# Patient Record
Sex: MALE | Race: BLACK OR AFRICAN AMERICAN | NOT HISPANIC OR LATINO | URBAN - METROPOLITAN AREA
[De-identification: names, ages, dates, MRNs, and addresses within clinical notes are randomized per-mention and may not be internally consistent; named-entity substitution may affect disease eponyms.]

---

## 2018-05-19 ENCOUNTER — EMERGENCY (EMERGENCY)
Facility: HOSPITAL | Age: 28
LOS: 1 days | Discharge: PSYCHIATRIC OR SUBSTANCE USE FACILITY | End: 2018-05-19
Attending: EMERGENCY MEDICINE
Payer: COMMERCIAL

## 2018-05-19 VITALS
HEART RATE: 98 BPM | OXYGEN SATURATION: 96 % | SYSTOLIC BLOOD PRESSURE: 147 MMHG | DIASTOLIC BLOOD PRESSURE: 100 MMHG | RESPIRATION RATE: 18 BRPM

## 2018-05-19 LAB
ALBUMIN SERPL ELPH-MCNC: 4 G/DL — SIGNIFICANT CHANGE UP (ref 3.3–5)
ALP SERPL-CCNC: 85 U/L — SIGNIFICANT CHANGE UP (ref 40–120)
ALT FLD-CCNC: 18 U/L — SIGNIFICANT CHANGE UP (ref 10–45)
AMPHET UR-MCNC: NEGATIVE — SIGNIFICANT CHANGE UP
ANION GAP SERPL CALC-SCNC: 13 MMOL/L — SIGNIFICANT CHANGE UP (ref 5–17)
APAP SERPL-MCNC: <15 UG/ML — SIGNIFICANT CHANGE UP (ref 10–30)
APPEARANCE UR: CLEAR — SIGNIFICANT CHANGE UP
AST SERPL-CCNC: 26 U/L — SIGNIFICANT CHANGE UP (ref 10–40)
BARBITURATES UR SCN-MCNC: NEGATIVE — SIGNIFICANT CHANGE UP
BASOPHILS # BLD AUTO: 0 K/UL — SIGNIFICANT CHANGE UP (ref 0–0.2)
BASOPHILS NFR BLD AUTO: 0.3 % — SIGNIFICANT CHANGE UP (ref 0–2)
BENZODIAZ UR-MCNC: NEGATIVE — SIGNIFICANT CHANGE UP
BILIRUB SERPL-MCNC: 0.4 MG/DL — SIGNIFICANT CHANGE UP (ref 0.2–1.2)
BILIRUB UR-MCNC: NEGATIVE — SIGNIFICANT CHANGE UP
BUN SERPL-MCNC: 8 MG/DL — SIGNIFICANT CHANGE UP (ref 7–23)
CALCIUM SERPL-MCNC: 8.9 MG/DL — SIGNIFICANT CHANGE UP (ref 8.4–10.5)
CHLORIDE SERPL-SCNC: 102 MMOL/L — SIGNIFICANT CHANGE UP (ref 96–108)
CO2 SERPL-SCNC: 21 MMOL/L — LOW (ref 22–31)
COCAINE METAB.OTHER UR-MCNC: NEGATIVE — SIGNIFICANT CHANGE UP
COLOR SPEC: SIGNIFICANT CHANGE UP
CREAT SERPL-MCNC: 0.84 MG/DL — SIGNIFICANT CHANGE UP (ref 0.5–1.3)
DIFF PNL FLD: NEGATIVE — SIGNIFICANT CHANGE UP
EOSINOPHIL # BLD AUTO: 0 K/UL — SIGNIFICANT CHANGE UP (ref 0–0.5)
EOSINOPHIL NFR BLD AUTO: 0.6 % — SIGNIFICANT CHANGE UP (ref 0–6)
ETHANOL SERPL-MCNC: SIGNIFICANT CHANGE UP MG/DL (ref 0–10)
GLUCOSE SERPL-MCNC: 124 MG/DL — HIGH (ref 70–99)
GLUCOSE UR QL: NEGATIVE — SIGNIFICANT CHANGE UP
HCT VFR BLD CALC: 42.9 % — SIGNIFICANT CHANGE UP (ref 39–50)
HGB BLD-MCNC: 14.3 G/DL — SIGNIFICANT CHANGE UP (ref 13–17)
KETONES UR-MCNC: NEGATIVE — SIGNIFICANT CHANGE UP
LEUKOCYTE ESTERASE UR-ACNC: NEGATIVE — SIGNIFICANT CHANGE UP
LYMPHOCYTES # BLD AUTO: 1.2 K/UL — SIGNIFICANT CHANGE UP (ref 1–3.3)
LYMPHOCYTES # BLD AUTO: 20.1 % — SIGNIFICANT CHANGE UP (ref 13–44)
MCHC RBC-ENTMCNC: 30.7 PG — SIGNIFICANT CHANGE UP (ref 27–34)
MCHC RBC-ENTMCNC: 33.4 GM/DL — SIGNIFICANT CHANGE UP (ref 32–36)
MCV RBC AUTO: 92 FL — SIGNIFICANT CHANGE UP (ref 80–100)
METHADONE UR-MCNC: NEGATIVE — SIGNIFICANT CHANGE UP
MONOCYTES # BLD AUTO: 0.6 K/UL — SIGNIFICANT CHANGE UP (ref 0–0.9)
MONOCYTES NFR BLD AUTO: 10.3 % — SIGNIFICANT CHANGE UP (ref 2–14)
NEUTROPHILS # BLD AUTO: 4.2 K/UL — SIGNIFICANT CHANGE UP (ref 1.8–7.4)
NEUTROPHILS NFR BLD AUTO: 68.7 % — SIGNIFICANT CHANGE UP (ref 43–77)
NITRITE UR-MCNC: NEGATIVE — SIGNIFICANT CHANGE UP
OPIATES UR-MCNC: NEGATIVE — SIGNIFICANT CHANGE UP
OXYCODONE UR-MCNC: NEGATIVE — SIGNIFICANT CHANGE UP
PCP SPEC-MCNC: SIGNIFICANT CHANGE UP
PCP UR-MCNC: NEGATIVE — SIGNIFICANT CHANGE UP
PH UR: 6.5 — SIGNIFICANT CHANGE UP (ref 5–8)
PLATELET # BLD AUTO: 151 K/UL — SIGNIFICANT CHANGE UP (ref 150–400)
POTASSIUM SERPL-MCNC: 3.7 MMOL/L — SIGNIFICANT CHANGE UP (ref 3.5–5.3)
POTASSIUM SERPL-SCNC: 3.7 MMOL/L — SIGNIFICANT CHANGE UP (ref 3.5–5.3)
PROT SERPL-MCNC: 7.2 G/DL — SIGNIFICANT CHANGE UP (ref 6–8.3)
PROT UR-MCNC: NEGATIVE — SIGNIFICANT CHANGE UP
RBC # BLD: 4.66 M/UL — SIGNIFICANT CHANGE UP (ref 4.2–5.8)
RBC # FLD: 11.2 % — SIGNIFICANT CHANGE UP (ref 10.3–14.5)
SALICYLATES SERPL-MCNC: <2 MG/DL — LOW (ref 15–30)
SODIUM SERPL-SCNC: 136 MMOL/L — SIGNIFICANT CHANGE UP (ref 135–145)
SP GR SPEC: 1.01 — LOW (ref 1.01–1.02)
THC UR QL: NEGATIVE — SIGNIFICANT CHANGE UP
TROPONIN T SERPL-MCNC: <0.01 NG/ML — SIGNIFICANT CHANGE UP (ref 0–0.06)
TSH SERPL-MCNC: 2.48 UIU/ML — SIGNIFICANT CHANGE UP (ref 0.27–4.2)
UROBILINOGEN FLD QL: NEGATIVE — SIGNIFICANT CHANGE UP
WBC # BLD: 6.2 K/UL — SIGNIFICANT CHANGE UP (ref 3.8–10.5)
WBC # FLD AUTO: 6.2 K/UL — SIGNIFICANT CHANGE UP (ref 3.8–10.5)

## 2018-05-19 PROCEDURE — 71045 X-RAY EXAM CHEST 1 VIEW: CPT | Mod: 26

## 2018-05-19 PROCEDURE — 90792 PSYCH DIAG EVAL W/MED SRVCS: CPT | Mod: GT

## 2018-05-19 PROCEDURE — 70450 CT HEAD/BRAIN W/O DYE: CPT | Mod: 26

## 2018-05-19 PROCEDURE — 93010 ELECTROCARDIOGRAM REPORT: CPT

## 2018-05-19 PROCEDURE — 99285 EMERGENCY DEPT VISIT HI MDM: CPT | Mod: 25

## 2018-05-19 RX ORDER — DIPHENHYDRAMINE HCL 50 MG
50 CAPSULE ORAL ONCE
Qty: 0 | Refills: 0 | Status: COMPLETED | OUTPATIENT
Start: 2018-05-19 | End: 2018-05-19

## 2018-05-19 RX ORDER — HALOPERIDOL DECANOATE 100 MG/ML
5 INJECTION INTRAMUSCULAR ONCE
Qty: 0 | Refills: 0 | Status: COMPLETED | OUTPATIENT
Start: 2018-05-19 | End: 2018-05-19

## 2018-05-19 RX ADMIN — Medication 2 MILLIGRAM(S): at 16:15

## 2018-05-19 RX ADMIN — Medication 2 MILLIGRAM(S): at 15:55

## 2018-05-19 RX ADMIN — HALOPERIDOL DECANOATE 5 MILLIGRAM(S): 100 INJECTION INTRAMUSCULAR at 16:14

## 2018-05-19 RX ADMIN — Medication 50 MILLIGRAM(S): at 16:14

## 2018-05-19 RX ADMIN — HALOPERIDOL DECANOATE 5 MILLIGRAM(S): 100 INJECTION INTRAMUSCULAR at 15:55

## 2018-05-19 NOTE — ED PROVIDER NOTE - PROGRESS NOTE DETAILS
Patient severely agitated despite escalation measures, given sedation medications, required restraints due to danger to himself and others.  - Jayy Espino MD Patient calm and cooperative. Daytime Psych Attg signed-out to TelePsych. Awaiting TelePsych eval for dispo. Hand-off to Dr Ramon. Ankit Parmar MD (resident): Dr. Nichols (psych) is aware of pending reevaluation and will come to reassess pt. Ankit Parmar MD (resident): patient eval'd by Dr. Nichols and requires inpatient psych admission due to lack of safety to himself and others. acute psychosis, either 2/2 adderall or bipolar (new onset). 2PC filled out. pt given Ativan as per psych recs. Pending placement, GARY Bowden on board.

## 2018-05-19 NOTE — ED PROVIDER NOTE - PHYSICAL EXAMINATION
PE: CONSTITUTIONAL: Nontoxic, in severe agitation and apparent distress, now sedated s/p medications. ENMT: Airway patent, nasal mucosa clear, mouth with normal mucosa. HEAD: NCAT EYES: PERRL, EOMI bilaterally CARDIAC: RRR, no pedal edema RESPIRATORY: CTA bilaterally grossly, no adventitious sounds GI: Abdomen non-distended, non-tender MSK: Spine appears normal, range of motion is not limited, no muscle/joint tenderness NEURO: sedated, previously moving all extremities, no focal neurological deficits, gait intact prior to sedation SKIN: Skin tone normal in color, warm and dry. No evidence of rash. PSYCH: now sedated, previously agitated with disorganized behavior, irrational thinking

## 2018-05-19 NOTE — ED ADULT NURSE REASSESSMENT NOTE - NS ED NURSE REASSESS COMMENT FT1
Restraints discontinued at 1715, applied from [id number], VSS, no signs of injury, full ROM, pt sleeping at this time; BW and EKG obtained, remains on constant observation for safety, continue to monitor.

## 2018-05-19 NOTE — ED ADULT NURSE REASSESSMENT NOTE - NS ED NURSE REASSESS COMMENT FT1
pt. was escorted to the bathroom and he was able to give a urine specimen. pt. was calm and cooperative. he was given his dinner tray by 1:1 staff. will continue to offer support and monitor for safety.

## 2018-05-19 NOTE — ED PROVIDER NOTE - ATTENDING CONTRIBUTION TO CARE
Dr. Moreland : I have personally seen and examined this patient at the bedside. I have fully participated in the care of this patient. I have reviewed all pertinent clinical information, including history, physical exam, plan and the Resident's note and agree except as noted.     28yo M no pmh of psych hx p/w psychosis not sleeping x 24hrs, as per brother notes that was started on adderral in the beginning of this month (has 19 pills left in the bottle)- not sure if that what is making him this way. no hx of si/hi as per family.   Denies f/c/n/v/cp/sob/palpitations/cough/abd.pain/d/c/dysuria/hematuria. no sick contacts/recent travel. no head trauma    PE:  head; atraumatic normocephalic  eyes: perrla  Heart: rrr s1s2  lungs: ctab  abd: soft, nt nd + bs no rebound/guarding no cva ttp  le: no swelling no calf ttp  back: no midline cervical/thoracic/lumbar ttp      -->possibly psychotic episode--emily fu ct head labs tox; ekg fluids; 10/4 for pts protection and staff as pt is aggressive psych consult--reassess

## 2018-05-19 NOTE — ED PROVIDER NOTE - SHIFT CHANGE DETAILS
Viktoria Wilson MD - Attending Physician: Pt here with acute psychosis. Very agitated, required chemical sedation. Awaiting labs for medical clearance, then psych consult

## 2018-05-19 NOTE — ED PROVIDER NOTE - OBJECTIVE STATEMENT
27y Male PMH possible ADHD BIB family for severe agitation. Information from family as patient was agitated and uncooperative to provide a history. Patient was prescribed Adderall as an outpatient by a doctor referred by his PCP, patient appeared to have been taking the correct dose of medications as patient still has 19 pills in his bottle. Over the course of the past two weeks since taking the medication, family notes patient has been more aggressive and agitated, worsening especially in the last 24 hours as he has not gotten any sleep. No psychiatric history, no known other drug ingestions, patient reticent to come to the hospital, but family convinced him to come.

## 2018-05-19 NOTE — ED ADULT NURSE NOTE - OBJECTIVE STATEMENT
27 year old male BIB brother and PD for decompensation "Not acting right". Upon arrival pt was paranoid appeared thought blocked and fearful, kept scanning room and brother had to physically hold him back from leaving. Pt them became agitated and started screaming, all attempts at deescalation unsuccusseful, manual 27 year old male BIB brother and PD for decompensation "Not acting right". Upon arrival pt was paranoid appeared thought blocked and fearful, kept scanning room and brother had to physically hold him back from leaving. Pt them became agitated and started screaming, all attempts at deescalation unsuccessful, manual hold applied by staff and police and pt given haldol 5mg and ativan 2mg IM with no effect, police had to apply handcuffs and pt was brought to stretcher and placed in 4 point restraints; given haldol 5mg ativan 2mg and benadryl 50mg with better effect, remained resistant to all tx but calmer. Brother had bottle of prescription amphetamines that pt believed ot be using. Conitue to monitor. 27 year old male BIB brother and sister with [place] [employer] and EMS "Not acting right". Upon arrival pt was paranoid, appeared thought blocked and fearful, kept scanning room and brother had to physically hold him back from leaving. Pt them became agitated and started screaming, all attempts at deescalation unsuccessful, manual hold applied by staff and police and pt given haldol 5mg and ativan 2mg IM with no effect, police had to apply handcuffs and pt was brought to stretcher and placed in 4 point restraints; given haldol 5mg ativan 2mg and benadryl 50mg with better effect, remained resistant to all tx but calmer. Brother had bottle of prescription amphetamines that pt is believed to be taking "his wife says he take Adderall", brother states that pt began acting erratically yesterday, appeared psychotic and did not sleep all night; as per brother pt has no psych HX and no drug HX. Continue to monitor.

## 2018-05-20 ENCOUNTER — INPATIENT (INPATIENT)
Facility: HOSPITAL | Age: 28
LOS: 10 days | Discharge: HOME | End: 2018-05-31
Attending: PSYCHIATRY & NEUROLOGY | Admitting: PSYCHIATRY & NEUROLOGY
Payer: COMMERCIAL

## 2018-05-20 VITALS
RESPIRATION RATE: 20 BRPM | TEMPERATURE: 98 F | HEART RATE: 85 BPM | OXYGEN SATURATION: 100 % | DIASTOLIC BLOOD PRESSURE: 95 MMHG | SYSTOLIC BLOOD PRESSURE: 140 MMHG

## 2018-05-20 VITALS — TEMPERATURE: 97 F

## 2018-05-20 DIAGNOSIS — F19.950 OTHER PSYCHOACTIVE SUBSTANCE USE, UNSPECIFIED WITH PSYCHOACTIVE SUBSTANCE-INDUCED PSYCHOTIC DISORDER WITH DELUSIONS: ICD-10-CM

## 2018-05-20 DIAGNOSIS — F06.2 PSYCHOTIC DISORDER WITH DELUSIONS DUE TO KNOWN PHYSIOLOGICAL CONDITION: ICD-10-CM

## 2018-05-20 PROCEDURE — 96372 THER/PROPH/DIAG INJ SC/IM: CPT

## 2018-05-20 PROCEDURE — 99285 EMERGENCY DEPT VISIT HI MDM: CPT | Mod: 25

## 2018-05-20 PROCEDURE — 84484 ASSAY OF TROPONIN QUANT: CPT

## 2018-05-20 PROCEDURE — 81003 URINALYSIS AUTO W/O SCOPE: CPT

## 2018-05-20 PROCEDURE — 93005 ELECTROCARDIOGRAM TRACING: CPT

## 2018-05-20 PROCEDURE — 70450 CT HEAD/BRAIN W/O DYE: CPT

## 2018-05-20 PROCEDURE — 90853 GROUP PSYCHOTHERAPY: CPT

## 2018-05-20 PROCEDURE — 80053 COMPREHEN METABOLIC PANEL: CPT

## 2018-05-20 PROCEDURE — 85027 COMPLETE CBC AUTOMATED: CPT

## 2018-05-20 PROCEDURE — 80307 DRUG TEST PRSMV CHEM ANLYZR: CPT

## 2018-05-20 PROCEDURE — 71045 X-RAY EXAM CHEST 1 VIEW: CPT

## 2018-05-20 PROCEDURE — 84443 ASSAY THYROID STIM HORMONE: CPT

## 2018-05-20 RX ORDER — HALOPERIDOL DECANOATE 100 MG/ML
5 INJECTION INTRAMUSCULAR EVERY 4 HOURS
Qty: 0 | Refills: 0 | Status: DISCONTINUED | OUTPATIENT
Start: 2018-05-20 | End: 2018-05-31

## 2018-05-20 RX ORDER — ACETAMINOPHEN 500 MG
975 TABLET ORAL ONCE
Qty: 0 | Refills: 0 | Status: COMPLETED | OUTPATIENT
Start: 2018-05-20 | End: 2018-05-20

## 2018-05-20 RX ORDER — RISPERIDONE 4 MG/1
1 TABLET ORAL AT BEDTIME
Qty: 0 | Refills: 0 | Status: DISCONTINUED | OUTPATIENT
Start: 2018-05-20 | End: 2018-05-21

## 2018-05-20 RX ORDER — BENZTROPINE MESYLATE 1 MG
1 TABLET ORAL ONCE
Qty: 0 | Refills: 0 | Status: COMPLETED | OUTPATIENT
Start: 2018-05-20 | End: 2018-05-20

## 2018-05-20 RX ORDER — ACETAMINOPHEN 500 MG
650 TABLET ORAL EVERY 6 HOURS
Qty: 0 | Refills: 0 | Status: DISCONTINUED | OUTPATIENT
Start: 2018-05-20 | End: 2018-05-31

## 2018-05-20 RX ORDER — DIPHENHYDRAMINE HCL 50 MG
50 CAPSULE ORAL ONCE
Qty: 0 | Refills: 0 | Status: DISCONTINUED | OUTPATIENT
Start: 2018-05-20 | End: 2018-05-31

## 2018-05-20 RX ORDER — BENZTROPINE MESYLATE 1 MG
1 TABLET ORAL DAILY
Qty: 0 | Refills: 0 | Status: DISCONTINUED | OUTPATIENT
Start: 2018-05-21 | End: 2018-05-30

## 2018-05-20 RX ORDER — HALOPERIDOL DECANOATE 100 MG/ML
5 INJECTION INTRAMUSCULAR ONCE
Qty: 0 | Refills: 0 | Status: DISCONTINUED | OUTPATIENT
Start: 2018-05-20 | End: 2018-05-31

## 2018-05-20 RX ORDER — DIPHENHYDRAMINE HCL 50 MG
50 CAPSULE ORAL EVERY 6 HOURS
Qty: 0 | Refills: 0 | Status: DISCONTINUED | OUTPATIENT
Start: 2018-05-20 | End: 2018-05-31

## 2018-05-20 RX ADMIN — Medication 2 MILLIGRAM(S): at 16:15

## 2018-05-20 RX ADMIN — Medication 1 MILLIGRAM(S): at 09:40

## 2018-05-20 RX ADMIN — Medication 1 MILLIGRAM(S): at 16:15

## 2018-05-20 RX ADMIN — RISPERIDONE 1 MILLIGRAM(S): 4 TABLET ORAL at 22:11

## 2018-05-20 NOTE — ED BEHAVIORAL HEALTH ASSESSMENT NOTE - OTHER
15 Tika Benjamin brother family some residual paranoia as per HPI somewhat somnolent but easily arousable to verbal stimuli not assessed n/a

## 2018-05-20 NOTE — CHART NOTE - NSCHARTNOTEFT_GEN_A_CORE
Patient referred to Social Work by Psychiatry MD due to patient requiring an inpatient psych admission. Chart reviewed. Patient is a 28 y/o, Male with no formal PPHx, newly diagnosed with ADHD as outpatient and started on Adderall p/w acute onset paranoia, hyper-religiosity, and manic symptoms.  Patient has been agitated and combative in ER, required multiple Haldol/Ativan PRNs and on restraints. Per psychiatry, patient diagnosed with Substance induced psychotic disorder with delusions. Per psychiatry, patient requires involuntary admission to inpatient psych unit for safety and stabilization.     SW paged and spoke with [facility] GOKUL Yip to confirm bed availability. SW faxed requested documentation to [phone]. Documentation reviewed and admission accepted by AUSTIN MD Robbins. Per GOKUL Yip, patient to have bed at [address] [place] [address] (ph. [phone]). [facility] Telepsych email sent with the above details. Psych MD Nichols informed of all transfer details and will fax Medical Readiness Form to [facility]. ED MD Parmar aware and reports patient is medically cleared for discharge.  MIGDALIA Euceda informed to contact [address] [place] [address] with RN Handoff and to contact EMS for non-emergent transport from [facility] ED to [address].  Packet prepared. Original legals placed in transfer packet to travel with patient.    Patient with [employer] (policy #[id number]) insurance benefits. [facility] contacted [employer] (ph. [phone]) to obtain insurance authorization for psych transfer. Clinicals provided. Patient authorized for 6 days. Next review is on 5/25,  to be assigned.  Authorization #[id number]. Patient authorized for ambulance services through [facility]. Same authorization.   SW contacted patient's brother, Vasyl (ph. [phone]) to inform of all the details of above. Patient's brother aware of unit and phone number to unit. Social Work will continue to remain available and collaborate with interdisciplinary team.

## 2018-05-20 NOTE — ED BEHAVIORAL HEALTH ASSESSMENT NOTE - RISK ASSESSMENT
Pt is currently at moderately elevated risk of harm to self & others given his lingering paranoia & very recent episode of acute agitation & psychosis, and warrants continued observation in the ED until the morning & re-assessment for a final determination of risk prior to his being discharged home.

## 2018-05-20 NOTE — ED ADULT NURSE REASSESSMENT NOTE - NS ED NURSE REASSESS COMMENT FT1
reiterated to pt. that psychiatry will re-evaluate him for final disposition. remains on 1:1 CO for psychosis.

## 2018-05-20 NOTE — ED ADULT NURSE REASSESSMENT NOTE - NS ED NURSE REASSESS COMMENT FT1
pt. was evaluated via Telepsych in a private room w/ 1:1 male staff in the room and was able to participate w/ the interview. However, the disposition is pending @ this time. will continue to monitor.

## 2018-05-20 NOTE — ED BEHAVIORAL HEALTH ASSESSMENT NOTE - SUMMARY
Pt's history & presentation are most consistent with a substance-induced psychotic episode, induced in this case by Adderall.  The relatively low dose of Adderall that the patient has been taking, however, is not likely to cause adverse effects this pronounced in the absence of some underlying predisposition toward manic or psychotic illness; there is thus a relatively high likelihood that this is what has been unmasked in this case, specifically a previously unrecognized & unmanifested diathesis toward bipolar illness.  Both the pt & his family have been made aware of this likelihood, which is the principal rationale behind my offering him voluntary psychiatric admission, which he has declined.  In his current state, he does not quite appear to be at an acute or imminent risk of dangerousness of sufficient severity to meet involuntary commitment criteria. I have recommended to the pt that he remain in the ED to obtain more sleep & permit further psychiatric stabilization before he is permitted to leave, to which he is amenable.    Plan therefore will be to hold pt in the ED for re-assessment in the morning, at which time he will be discharged home if deemed stable enough to do so.  His family should be contacted at that point so they can come back to the ED & escort the pt home.

## 2018-05-20 NOTE — ED BEHAVIORAL HEALTH ASSESSMENT NOTE - OTHER PAST PSYCHIATRIC HISTORY (INCLUDE DETAILS REGARDING ONSET, COURSE OF ILLNESS, INPATIENT/OUTPATIENT TREATMENT)
As per HPI.  Prior to this month, patient has never received mental health treatment of any kind.  No lifetime history of symptoms consistent with depression, valeri or psychosis prior to the past 1-2 months.

## 2018-05-20 NOTE — ED BEHAVIORAL HEALTH ASSESSMENT NOTE - PSYCHIATRIC ISSUES AND PLAN (INCLUDE STANDING AND PRN MEDICATION)
For acute agitation or dangerousness, can use Haldol 5mg PO / IM, Ativan 2mg PO / IM and/or Benadryl 50mg PO / IM, all Q6h PRN.

## 2018-05-20 NOTE — ED BEHAVIORAL HEALTH NOTE - BEHAVIORAL HEALTH NOTE
Pt seen for follow-up of valeri/psychosis.  Pt received PRN Haldol 10mg and Ativan IM 4mg total at 4pm yesterday.      Pt today stating "I think I might know when the apocalypse is coming," additionally endorsed auditory and visual hallucinations, stated that he is the "Antichrist" asked interviewer "how is the world right now?" and "may I please speak with the  I need him to pray for me."  Pt is A&O x3.  Pt reports he has been taking Adderall which was prescribed to him by a NP in [place].  I STOP Ref [id number] indicates pt has one prescription of Adderall 10mg, 30 day supply filled 05/02/2018.  Pt stated that he has an associates degree in [employer] 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  Pt reports now that "my wife is primary breadwinner."        Collateral obtained from wife and brother Newton ([phone]).  Wife reports that pt has experienced a lot of stress as he has been unemployed for last 3 months.  Pt and wife have 2 children, currently live with parents.  Over the past 2 days pt has made increasingly bizarre statements including "my life is over" and "the police are following me."    Brother Newton reports that pt was at baseline level of functioning 4 months ago however his behavior changed ~3 months ago shortly after he lost his job.  Over this time brother has noted that pt has been increasingly irritable and argumentative, has also been very depressed and made suicidal statements.    MSE:  General appearance: appears stated age  Behavior/attitude: Cooperative initially then increasingly agitated and uncooperative, unable to complete interview after pt demanded to speak with   Psychomotor: Abnormal posturing while in bed  Speech: Normal rate, tone, prosody  Mood: Unable to elicit  Affect: Anxious appearing  Thought process: Disorganized, tangential  Thought content: Grandiose delusion that he is the antichrist and that the world's future is in his hands  Perception: Endorsed auditory and visual hallucinations: "hearing voices and seeing things", unable to specify further after repeated questioning  Impulse control: Impaired  Insight/judgment: Poor/poor    A/P Pt is a 28 YO male domiciled with parents, wife, and 2 children in [place], unemployed for past 3 months, recently prescribed Adderall 10mg x30 on 5/2/18 (I STOP Ref [id number]) for presumed ADHD, no prior psychiatric hospitalizations, no other substance abuse, BIB family after 2wk period of increasingly bizarre behavior in context of months of irritability, agitation, and depressed mood.  Currently pt is exhibiting disorganized thought process, grandiose delusion that he is the antichrist, and endorsing auditory/visual hallucinations of "hearing voices and seeing things."  Current presentation indicative of brief psychotic episode.  Differential includes brief psychotic episode, substance induced vs. brief psychotic episode vs. bipolar disorder vs. MDD with psychosis.  In pt's current psychotic state pt is at a danger to himself and others and warrants inpatient psychiatric hospitalization for symptom stabilization.  Brother and wife have been notified, in agreement with plan.      Plan-  Psychiatric- Haldol 5mg, Ativan 2mg, Benadryl 50mg PO Q4H/IM  PRN agitation.    Medical-- CBC, CMP,, CT head, CXR WNL  Substance- UDS unremarkable (negative amphetamines)  Safety- CO for agitation  Disposition: Admit to inpatient psychiatric hospital, 9.27 involuntary status Pt seen for follow-up of valeri/psychosis.  Pt received PRN Haldol 10mg and Ativan IM 4mg total at 4pm yesterday.      Pt today stating "I think I might know when the apocalypse is coming," additionally endorsed auditory and visual hallucinations, stated that he is the "Antichrist" asked interviewer "how is the world right now?" and "may I please speak with the  I need him to pray for me."  Pt is A&O x3.  Pt reports he has been taking Adderall which was prescribed to him by a NP in [place].  I STOP Ref [id number] indicates pt has one prescription of Adderall 10mg, 30 day supply filled 05/02/2018.  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  Pt reports now that "my wife is primary breadwinner."        Collateral obtained from wife and brother Newton ([phone]).  Wife reports that pt has experienced a lot of stress as he has been unemployed for last 3 months.  Pt and wife have 2 children, currently live with parents.  Over the past 2 days pt has made increasingly bizarre statements including "my life is over" and "the police are following me."    Brother Newton reports that pt was at baseline level of functioning 4 months ago however his behavior changed ~3 months ago shortly after he lost his job.  Over this time brother has noted that pt has been increasingly irritable and argumentative, has also been very depressed and made suicidal statements.    MSE:  General appearance: appears stated age  Behavior/attitude: Cooperative initially then increasingly agitated and uncooperative, unable to complete interview after pt demanded to speak with   Psychomotor: Abnormal posturing while in bed  Speech: Normal rate, tone, prosody  Mood: Unable to elicit  Affect: Anxious appearing  Thought process: Disorganized, tangential  Thought content: Grandiose delusion that he is the antichrist and that the world's future is in his hands  Perception: Endorsed auditory and visual hallucinations: "hearing voices and seeing things", unable to specify further after repeated questioning  Impulse control: Impaired  Insight/judgment: Poor/poor    A/P Pt is a 26 YO male domiciled with parents, wife, and 2 children in [place], unemployed for past 3 months, recently prescribed Adderall 10mg x30 on 5/2/18 (I STOP Ref [id number]) for presumed ADHD, no prior psychiatric hospitalizations, no other substance abuse, BIB family after 2wk period of increasingly bizarre behavior in context of months of irritability, agitation, and depressed mood.  Currently pt is exhibiting disorganized thought process, grandiose delusion that he is the antichrist, and endorsing auditory/visual hallucinations of "hearing voices and seeing things."  Current presentation indicative of brief psychotic episode.  Differential includes brief psychotic episode, substance induced vs. brief psychotic episode vs. bipolar disorder vs. MDD with psychosis.  In pt's current psychotic state pt is at a danger to himself and others and warrants inpatient psychiatric hospitalization for symptom stabilization.  Brother and wife have been notified, in agreement with plan.      Plan-  Psychiatric- Haldol 5mg, Ativan 2mg, Benadryl 50mg PO Q6H/IM  PRN agitation.    Medical-- CBC, CMP,, CT head, CXR WNL  Substance- UDS unremarkable (negative amphetamines)  Safety- CO for agitation while in ER, can be d/c when inpatient but low threshold to reinstate if continues to be agitated  Disposition: Admit to inpatient psychiatric hospital, 9.27 involuntary status      Attending Addendum (VIVIENNE Nichols MD):    27M with no formal PPHx, newly diagnosed with ADHD as outpatient and started on Adderall p/w acute onset paranoia, hyperreligiosity, and manic sx.  Has been agitated and combative in ER, required multiple Haldol/Ativan PRNs and restraints ON.  This morning, remains paranoid, impulsive, guarded.  Terminates psychiatric eval early and requesting to speak with a , states he has [employer] duties to perform.  Overnight was reportedly requesting security guards "shoot him" and kill him.  Pt's family concerned for safety.  Pt requires psychiatric admission for safety and stabilization.  Would initiate Risperdal 0.5mg PO bid.  Agree with PRNs above.  1:1 in ER and can be d/c as inpatient, but would have low threshold to reinstate 1:1 at [facility] if becomes agitated again.

## 2018-05-20 NOTE — ED ADULT NURSE REASSESSMENT NOTE - NS ED NURSE REASSESS COMMENT FT1
pt. to be evaluated via Telepsych for psychosis. maintained on 1:1 CO for psychosis. pt. is sleeping.

## 2018-05-20 NOTE — ED BEHAVIORAL HEALTH ASSESSMENT NOTE - INVOLUNTARY INTRAMUSCULAR MEDICATION DETAILS
Pt received a total of 10mg Haldol, 4mg Ativan & 50mg Benadryl IM as STAT PRN's for agitation at approximately 4:00pm

## 2018-05-20 NOTE — CHART NOTE - NSCHARTNOTEFT_GEN_A_CORE
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    FAMILY HISTORY:      MEDICATIONS  (STANDING):  risperiDONE   Tablet 1 milliGRAM(s) Oral at bedtime    MEDICATIONS  (PRN):  acetaminophen   Tablet 650 milliGRAM(s) Oral every 6 hours PRN pain  diphenhydrAMINE   Capsule 50 milliGRAM(s) Oral every 6 hours PRN EPS PPX  diphenhydrAMINE   Injectable 50 milliGRAM(s) IntraMuscular once PRN severe agitation secondary to psychosis  haloperidol     Tablet 5 milliGRAM(s) Oral every 4 hours PRN agitation secondary to psychosis  haloperidol    Injectable 5 milliGRAM(s) IntraMuscular once PRN severe agitation secondary to psychosis  LORazepam     Tablet 2 milliGRAM(s) Oral every 6 hours PRN Agitation  LORazepam   Injectable 2 milliGRAM(s) IntraMuscular once PRN severe agitation secondary to psychosis        CAPILLARY BLOOD GLUCOSE        I/O's Summary      PHYSICAL EXAM:  GENERAL: NAD, well-developed  HEAD:  Atraumatic, Normocephalic  EYES: EOMI, PERRLA, conjunctiva and sclera clear  NECK: Supple, No JVD  CHEST/LUNG: Clear to auscultation bilaterally; No wheeze  HEART: Regular rate and rhythm; No murmurs, rubs, or gallops  ABDOMEN: Soft, Nontender, Nondistended; Bowel sounds present  EXTREMITIES:  2+ Peripheral Pulses, No clubbing, cyanosis, or edema  PSYCH: AAOx3  NEUROLOGY: CN 2-12 intact  SKIN: No rashes or lesions    LABS:                        14.3   6.2   )-----------( 151      ( 19 May 2018 17:07 )             42.9     05-19    136  |  102  |  8   ----------------------------<  124<H>  3.7   |  21<L>  |  0.84    Ca    8.9      19 May 2018 17:07    TPro  7.2  /  Alb  4.0  /  TBili  0.4  /  DBili  x   /  AST  26  /  ALT  18  /  AlkPhos  85  -      CARDIAC MARKERS ( 19 May 2018 17:07 )  x     / <0.01 ng/mL / x     / x     / x          Urinalysis Basic - ( 19 May 2018 20:21 )    Color: PL Yellow / Appearance: Clear / S.008 / pH: x  Gluc: x / Ketone: Negative  / Bili: Negative / Urobili: Negative   Blood: x / Protein: Negative / Nitrite: Negative   Leuk Esterase: Negative / RBC: x / WBC x   Sq Epi: x / Non Sq Epi: x / Bacteria: x        RADIOLOGY & ADDITIONAL TESTS:    Assessment and Plan:  This is a 27 year old male with no pertinent PMH, however, reports recent diagnosis of ADHD and prescribed Adderall 10mg PO daily.  Patient states he only took a few pills since it was prescribed.  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  Patient denies any medical complains such as chest pain, SOB, lightheadedness/dizziness, n/v/d/c. His screening physical unremarkable and labs are grossly WNL.   1. Continue treatment as per primary psychiatry team: Risperidone 1mg PO standing: Diphenhydramine, Haloperidol, Lorazepam, and Acetaminophen PRN.

## 2018-05-20 NOTE — ED ADULT NURSE REASSESSMENT NOTE - NS ED NURSE REASSESS COMMENT FT1
pt. is awake and is focused on discharge. told him that he will be re-evaluated by psychiatrist in am for final disposition. remains on 1:1 CO for psychosis.

## 2018-05-20 NOTE — ED BEHAVIORAL HEALTH ASSESSMENT NOTE - DESCRIPTION
Pt highly agitated on initial presentation to the ED.  He received a total of 10mg Haldol, 4mg Ativan & 50mg Benadryl IM as STAT PRN's for agitation, ultimately with good effect. None As per HPI.  Pt is , no children, lives with his wife & sister, originally from [facility], where most of his extended family still live. Pt highly agitated on initial presentation to the ED.  He received a total of 10mg Haldol, 4mg Ativan & 50mg Benadryl IM as STAT PRN's for agitation, ultimately with good effect.  Pt has since slept through most of his ED stay thus far.    Third-party collateral obtained by [facility] April Masciana:  [facility] spoke with patient’s brother Newton to obtain collateral by phone. Brother states patient appears agitated and disorganized for 1-2 weeks with worsening of symptoms over past 2 days. Patient is reportedly fearful that the FBI is following him and focused on going to [facility] for safety. Patient has not slept for 36 hours and became agitated/yelling at family when encouraged to come to ED. Patient attempted to leave vehicle when in motion and required ED staff assistance to enter ED upon arrival. Brother states patient has no prior psychiatric history and was at baseline prior to starting new prescription of Adderall on 5/1/18 recommended by new psychiatrist. Family is unaware of any AH/VH and deny that patient has ever expressed any thoughts, intent or plans for suicide. Patient has no legal history or history of violence/aggression.  Patient does not use alcohol or drugs and has no family history of mental illness.  Family has expressed safety concerns for sudden change in overall presentation but deny that he is an acute risk for suicide. Patient lives with wife and sister and will not be left alone when able to return home.

## 2018-05-20 NOTE — ED BEHAVIORAL HEALTH ASSESSMENT NOTE - HPI (INCLUDE ILLNESS QUALITY, SEVERITY, DURATION, TIMING, CONTEXT, MODIFYING FACTORS, ASSOCIATED SIGNS AND SYMPTOMS)
Mr. Denny Earl is a , domiciled with wife & sister, recently unemployed, non-caregiver, 27-y.o. AAM with no relevant medical history, a psychiatric history of recent, provisional diagnosis of ADHD, no hospitalizations, no suicide attempts or non-suicidal self-injurious behavior, Mr. Denny Earl is a , domiciled with wife & sister, recently unemployed, non-caregiver, 27-y.o. AAM with no relevant medical history, a psychiatric history of recent, provisional diagnosis of ADHD, no hospitalizations, no suicide attempts or non-suicidal self-injurious behavior, no violence or forensic history, no substance abuse history, who is brought in by his family to the ED for a ~2 week history of increasingly bizarre behavior & paranoid ideation, along with inability to sleep for the previous 36 hours, occurring in the context of having been started on Adderall XR 10mg PO daily on 5/2/18.  On arrival to the ED at roughly 4:00pm today, the pt became extremely agitated & attempted to elope from the ED, ultimately receiving a total of 10mg Haldol, 4mg Ativan & 50mg Benadryl in successive IM injections as STAT PRNs, following which he slept for approximately 7-8 hours before waking up enough to adequately engage in interview. Mr. Denny Earl is a , domiciled with wife & sister, recently unemployed, non-caregiver, 27-y.o. AAM with no relevant medical history, a psychiatric history of recent, provisional diagnosis of ADHD, no hospitalizations, no suicide attempts or non-suicidal self-injurious behavior, no violence or forensic history, no substance abuse history, who is brought in by his family to the ED for a ~2 week history of increasingly bizarre behavior & paranoid ideation, along with inability to sleep for the previous 36 hours, occurring in the context of having been started on a trial of Adderall on 5/2/18.  On arrival to the ED at roughly 4:00pm today, the pt became extremely agitated & attempted to elope from the ED, ultimately receiving a total of 10mg Haldol, 4mg Ativan & 50mg Benadryl in successive IM injections as STAT PRNs, following which he slept for approximately 7-8 hours before waking up enough to adequately engage in interview.    On evaluation, pt is somewhat somnolent but arousable to verbal stimuli & generally able to provide coherent responses to questions.  He denies having had any mental health issues prior to losing his job ~2 months ago due to layoffs in the office of the [employer] member where he had been working for the preceding 2 years as a ; since the time this occurred, he states, "my mind has been spinning."  About a month ago the pt was referred from him PMD to a psychiatrist, Dr. Sreekanth Friedman, primarily for evaluation of concentration difficulties.  Upon evaluating the pt, Dr. Friedman made a provisional diagnosis of ADHD & started pt on a trial of Adderall XR 10mg daily, Mr. Denny Earl is a , domiciled with wife & sister, recently unemployed, non-caregiver, 27-y.o. AAM with no relevant medical history, a psychiatric history of recent, provisional diagnosis of ADHD, no hospitalizations, no suicide attempts or non-suicidal self-injurious behavior, no violence or forensic history, no substance abuse history, who is brought in by his family to the ED for a ~2 week history of increasingly bizarre behavior & paranoid ideation, along with inability to sleep for the previous 36 hours, occurring in the context of having been started on a trial of Adderall on 5/2/18.  On arrival to the ED at roughly 4:00pm today, the pt became extremely agitated & attempted to elope from the ED, ultimately receiving a total of 10mg Haldol, 4mg Ativan & 50mg Benadryl in successive IM injections as STAT PRNs, following which he slept for approximately 7-8 hours before waking up enough to adequately engage in interview.    On evaluation, pt is somewhat somnolent but arousable to verbal stimuli & generally able to provide coherent responses to questions.  He denies having had any mental health issues prior to losing his job ~2 months ago due to layoffs in the office of the [employer] member where he had been working for the preceding 2 years as a ; since the time this occurred, he states, "my mind has been spinning."  About a month ago the pt was referred from him PMD to a psychiatrist, Dr. Sreekanth Friedman, primarily for evaluation of concentration difficulties.  Upon evaluating the pt, Dr. Friedman made a provisional diagnosis of ADHD & started pt on a trial of Adderall XR 10mg daily,  Of note, p states he hasn't been taking the medication every day, more like every other day, and pt's family confirms that there are still 19 tabs left from the original supply of 30 tabs dispensed on 5/2/18, corroborating the pt's report.  Nevertheless, since this medication was started the pt's behavior has become more erratic, has been more angry & irritable & has started to feel paranoid, specifically that he is being targeted & monitored by the FBI, for unclear reasons.  He denies active suicidal ideation but does admit to passive thoughts of wishing he were dead, in order to "escape the pain that I'm in."  He denies thoughts of harming other people, denies auditory/visual hallucinations.  He denies any recent use of alcohol or illicit drugs.    At the time of interview, the pt continues to feel somewhat paranoid but denies passive or active suicidal ideation.  He continues to feel "less than clearheaded" but feels somewhat less confused & disoriented than he did at the time of initial presentation.  He is offered voluntary admission to inpatient psychiatry but declines, stating that he wishes to go home to be with his family, "eat a meal, and take a nap."  Short of immediate discharge, however, he is amenable to remaining in the ED overnight to obtain more sleep, with the plan for re-evaluation & likely discharge home in the AM.  Pt's family is amenable to this plan as well.

## 2018-05-21 LAB
CHOLEST SERPL-MCNC: 202 MG/DL — HIGH (ref 120–199)
HBA1C BLD-MCNC: 5.7 % — HIGH (ref 4–5.6)
HDLC SERPL-MCNC: 60 MG/DL — HIGH (ref 35–55)
HIV 1+2 AB+HIV1 P24 AG SERPL QL IA: SIGNIFICANT CHANGE UP
LIPID PNL WITH DIRECT LDL SERPL: 138 MG/DL — SIGNIFICANT CHANGE UP
T PALLIDUM AB TITR SER: NEGATIVE — SIGNIFICANT CHANGE UP
TRIGL SERPL-MCNC: 61 MG/DL — SIGNIFICANT CHANGE UP (ref 10–149)
VIT B12 SERPL-MCNC: 364 PG/ML — SIGNIFICANT CHANGE UP (ref 200–900)

## 2018-05-21 PROCEDURE — 90853 GROUP PSYCHOTHERAPY: CPT

## 2018-05-21 PROCEDURE — 99233 SBSQ HOSP IP/OBS HIGH 50: CPT | Mod: 25,GC

## 2018-05-21 RX ORDER — RISPERIDONE 4 MG/1
1 TABLET ORAL ONCE
Qty: 0 | Refills: 0 | Status: COMPLETED | OUTPATIENT
Start: 2018-05-21 | End: 2018-05-21

## 2018-05-21 RX ORDER — RISPERIDONE 4 MG/1
1 TABLET ORAL AT BEDTIME
Qty: 0 | Refills: 0 | Status: COMPLETED | OUTPATIENT
Start: 2018-05-21 | End: 2018-05-21

## 2018-05-21 RX ORDER — RISPERIDONE 4 MG/1
1 TABLET ORAL
Qty: 0 | Refills: 0 | Status: DISCONTINUED | OUTPATIENT
Start: 2018-05-22 | End: 2018-05-22

## 2018-05-21 RX ADMIN — Medication 2 MILLIGRAM(S): at 05:50

## 2018-05-21 RX ADMIN — RISPERIDONE 1 MILLIGRAM(S): 4 TABLET ORAL at 21:52

## 2018-05-21 RX ADMIN — HALOPERIDOL DECANOATE 5 MILLIGRAM(S): 100 INJECTION INTRAMUSCULAR at 16:55

## 2018-05-21 RX ADMIN — Medication 2 MILLIGRAM(S): at 16:55

## 2018-05-21 RX ADMIN — Medication 30 MILLILITER(S): at 15:00

## 2018-05-21 RX ADMIN — RISPERIDONE 1 MILLIGRAM(S): 4 TABLET ORAL at 16:56

## 2018-05-21 RX ADMIN — Medication 50 MILLIGRAM(S): at 16:55

## 2018-05-21 RX ADMIN — Medication 1 MILLIGRAM(S): at 10:07

## 2018-05-21 NOTE — CHART NOTE - NSCHARTNOTEFT_GEN_A_CORE
Notified by RN patient grabbed another patient's medication from her hand and swallowed it.  Patient states he took the medication away from the other patient because he thought it was "poison".  Patient reports he does not want anyone to get hurt and that's the reason he swallowed her medication.  Patient states he knows the medication was not for him and will not attempt to take someone else's medication from now on.  Per RN patient took Motrin 600mg PO; will continue to monitor. Notified by RN patient grabbed another patient's medication from her hand and swallowed it.  Patient states he took the medication away from the other patient because he thought it was "poison".  Patient reports he does not want anyone to get hurt and that's the reason he swallowed her medication.  Patient states he knows the medication was not for him and will not attempt to take someone else's medication from now on. Patient denies upset stomach, mild heartburn, diarrhea, constipation; bloating, gas; dizziness, headache, nervousness; skin itching or rash; blurred vision; or. ringing in your ears. Per RN patient took Motrin 600mg PO; will continue to monitor.

## 2018-05-22 PROCEDURE — 90853 GROUP PSYCHOTHERAPY: CPT

## 2018-05-22 PROCEDURE — 99232 SBSQ HOSP IP/OBS MODERATE 35: CPT | Mod: 25,GC

## 2018-05-22 RX ORDER — RISPERIDONE 4 MG/1
2 TABLET ORAL AT BEDTIME
Qty: 0 | Refills: 0 | Status: DISCONTINUED | OUTPATIENT
Start: 2018-05-22 | End: 2018-05-23

## 2018-05-22 RX ORDER — RISPERIDONE 4 MG/1
1 TABLET ORAL
Qty: 0 | Refills: 0 | Status: DISCONTINUED | OUTPATIENT
Start: 2018-05-22 | End: 2018-05-23

## 2018-05-22 RX ADMIN — RISPERIDONE 2 MILLIGRAM(S): 4 TABLET ORAL at 21:27

## 2018-05-22 RX ADMIN — Medication 1 MILLIGRAM(S): at 21:27

## 2018-05-22 RX ADMIN — Medication 1 MILLIGRAM(S): at 11:04

## 2018-05-22 RX ADMIN — RISPERIDONE 1 MILLIGRAM(S): 4 TABLET ORAL at 11:04

## 2018-05-23 PROCEDURE — 99232 SBSQ HOSP IP/OBS MODERATE 35: CPT | Mod: 25

## 2018-05-23 PROCEDURE — 90853 GROUP PSYCHOTHERAPY: CPT

## 2018-05-23 RX ORDER — RISPERIDONE 4 MG/1
3 TABLET ORAL AT BEDTIME
Qty: 0 | Refills: 0 | Status: DISCONTINUED | OUTPATIENT
Start: 2018-05-23 | End: 2018-05-25

## 2018-05-23 RX ORDER — RISPERIDONE 4 MG/1
1 TABLET ORAL
Qty: 0 | Refills: 0 | Status: DISCONTINUED | OUTPATIENT
Start: 2018-05-23 | End: 2018-05-25

## 2018-05-23 RX ADMIN — Medication 1 DROP(S): at 15:46

## 2018-05-23 RX ADMIN — RISPERIDONE 1 MILLIGRAM(S): 4 TABLET ORAL at 11:22

## 2018-05-23 RX ADMIN — Medication 2 MILLIGRAM(S): at 21:55

## 2018-05-23 RX ADMIN — Medication 1 MILLIGRAM(S): at 11:22

## 2018-05-23 RX ADMIN — RISPERIDONE 3 MILLIGRAM(S): 4 TABLET ORAL at 21:57

## 2018-05-24 PROCEDURE — 99232 SBSQ HOSP IP/OBS MODERATE 35: CPT | Mod: 25,GC

## 2018-05-24 PROCEDURE — 90853 GROUP PSYCHOTHERAPY: CPT

## 2018-05-24 RX ORDER — CIPROFLOXACIN HCL 0.3 %
1 DROPS OPHTHALMIC (EYE) EVERY 4 HOURS
Qty: 0 | Refills: 0 | Status: DISCONTINUED | OUTPATIENT
Start: 2018-05-26 | End: 2018-05-31

## 2018-05-24 RX ORDER — CIPROFLOXACIN HCL 0.3 %
1 DROPS OPHTHALMIC (EYE)
Qty: 0 | Refills: 0 | Status: COMPLETED | OUTPATIENT
Start: 2018-05-24 | End: 2018-05-26

## 2018-05-24 RX ADMIN — Medication 1 DROP(S): at 18:00

## 2018-05-24 RX ADMIN — Medication 1 DROP(S): at 13:34

## 2018-05-24 RX ADMIN — Medication 1 MILLIGRAM(S): at 09:28

## 2018-05-24 RX ADMIN — Medication 1 DROP(S): at 06:16

## 2018-05-24 RX ADMIN — Medication 2 MILLIGRAM(S): at 20:46

## 2018-05-24 RX ADMIN — Medication 1 DROP(S): at 22:10

## 2018-05-24 RX ADMIN — Medication 1 DROP(S): at 16:34

## 2018-05-24 RX ADMIN — Medication 1 DROP(S): at 12:21

## 2018-05-24 RX ADMIN — RISPERIDONE 1 MILLIGRAM(S): 4 TABLET ORAL at 09:29

## 2018-05-24 RX ADMIN — Medication 1 DROP(S): at 14:03

## 2018-05-24 RX ADMIN — Medication 1 DROP(S): at 20:11

## 2018-05-24 RX ADMIN — RISPERIDONE 3 MILLIGRAM(S): 4 TABLET ORAL at 20:47

## 2018-05-25 PROCEDURE — 99232 SBSQ HOSP IP/OBS MODERATE 35: CPT | Mod: 25,GC

## 2018-05-25 PROCEDURE — 90853 GROUP PSYCHOTHERAPY: CPT

## 2018-05-25 RX ORDER — RISPERIDONE 4 MG/1
4 TABLET ORAL AT BEDTIME
Qty: 0 | Refills: 0 | Status: DISCONTINUED | OUTPATIENT
Start: 2018-05-26 | End: 2018-05-31

## 2018-05-25 RX ORDER — RISPERIDONE 4 MG/1
3 TABLET ORAL AT BEDTIME
Qty: 0 | Refills: 0 | Status: COMPLETED | OUTPATIENT
Start: 2018-05-25 | End: 2018-05-25

## 2018-05-25 RX ADMIN — Medication 1 DROP(S): at 22:10

## 2018-05-25 RX ADMIN — Medication 1 DROP(S): at 00:00

## 2018-05-25 RX ADMIN — Medication 1 MILLIGRAM(S): at 08:59

## 2018-05-25 RX ADMIN — RISPERIDONE 3 MILLIGRAM(S): 4 TABLET ORAL at 21:37

## 2018-05-25 RX ADMIN — RISPERIDONE 1 MILLIGRAM(S): 4 TABLET ORAL at 09:00

## 2018-05-25 RX ADMIN — Medication 1 DROP(S): at 20:05

## 2018-05-25 RX ADMIN — Medication 1 DROP(S): at 10:58

## 2018-05-25 RX ADMIN — Medication 1 DROP(S): at 08:59

## 2018-05-25 RX ADMIN — Medication 1 DROP(S): at 12:32

## 2018-05-25 RX ADMIN — Medication 1 DROP(S): at 18:44

## 2018-05-25 RX ADMIN — Medication 2 MILLIGRAM(S): at 21:37

## 2018-05-25 RX ADMIN — Medication 1 DROP(S): at 06:06

## 2018-05-26 RX ADMIN — Medication 1 DROP(S): at 19:06

## 2018-05-26 RX ADMIN — Medication 1 DROP(S): at 16:14

## 2018-05-26 RX ADMIN — RISPERIDONE 4 MILLIGRAM(S): 4 TABLET ORAL at 21:12

## 2018-05-26 RX ADMIN — Medication 1 DROP(S): at 04:36

## 2018-05-26 RX ADMIN — Medication 1 MILLIGRAM(S): at 08:42

## 2018-05-26 RX ADMIN — Medication 1 DROP(S): at 08:43

## 2018-05-26 RX ADMIN — Medication 2 MILLIGRAM(S): at 08:43

## 2018-05-26 RX ADMIN — Medication 2 MILLIGRAM(S): at 21:12

## 2018-05-27 RX ADMIN — Medication 1 MILLIGRAM(S): at 09:38

## 2018-05-27 RX ADMIN — Medication 1 DROP(S): at 18:00

## 2018-05-27 RX ADMIN — Medication 1 DROP(S): at 06:27

## 2018-05-27 RX ADMIN — Medication 2 MILLIGRAM(S): at 09:38

## 2018-05-27 RX ADMIN — RISPERIDONE 4 MILLIGRAM(S): 4 TABLET ORAL at 21:12

## 2018-05-27 RX ADMIN — Medication 2 MILLIGRAM(S): at 21:12

## 2018-05-27 RX ADMIN — Medication 1 DROP(S): at 22:10

## 2018-05-27 RX ADMIN — Medication 1 DROP(S): at 09:39

## 2018-05-27 RX ADMIN — Medication 1 DROP(S): at 13:56

## 2018-05-27 RX ADMIN — Medication 1 DROP(S): at 21:22

## 2018-05-28 RX ADMIN — Medication 1 DROP(S): at 15:16

## 2018-05-28 RX ADMIN — Medication 1 DROP(S): at 06:57

## 2018-05-28 RX ADMIN — Medication 1 MILLIGRAM(S): at 10:04

## 2018-05-28 RX ADMIN — Medication 1 DROP(S): at 10:04

## 2018-05-28 RX ADMIN — Medication 2 MILLIGRAM(S): at 10:04

## 2018-05-28 RX ADMIN — Medication 1 DROP(S): at 18:44

## 2018-05-28 RX ADMIN — Medication 1 DROP(S): at 22:02

## 2018-05-28 RX ADMIN — RISPERIDONE 4 MILLIGRAM(S): 4 TABLET ORAL at 20:36

## 2018-05-28 RX ADMIN — Medication 2 MILLIGRAM(S): at 20:36

## 2018-05-29 PROCEDURE — 99232 SBSQ HOSP IP/OBS MODERATE 35: CPT | Mod: 25,GC

## 2018-05-29 RX ADMIN — RISPERIDONE 4 MILLIGRAM(S): 4 TABLET ORAL at 21:28

## 2018-05-29 RX ADMIN — Medication 1 DROP(S): at 15:49

## 2018-05-29 RX ADMIN — Medication 2 MILLIGRAM(S): at 09:25

## 2018-05-29 RX ADMIN — Medication 1 DROP(S): at 21:32

## 2018-05-29 RX ADMIN — Medication 1 DROP(S): at 19:12

## 2018-05-29 RX ADMIN — Medication 2 MILLIGRAM(S): at 21:28

## 2018-05-29 RX ADMIN — Medication 1 DROP(S): at 10:30

## 2018-05-29 RX ADMIN — Medication 1 DROP(S): at 06:49

## 2018-05-29 RX ADMIN — Medication 1 MILLIGRAM(S): at 09:25

## 2018-05-30 PROCEDURE — 99232 SBSQ HOSP IP/OBS MODERATE 35: CPT | Mod: 25,GC

## 2018-05-30 PROCEDURE — 90853 GROUP PSYCHOTHERAPY: CPT

## 2018-05-30 RX ORDER — BENZTROPINE MESYLATE 1 MG
0.5 TABLET ORAL DAILY
Qty: 0 | Refills: 0 | Status: DISCONTINUED | OUTPATIENT
Start: 2018-05-31 | End: 2018-05-31

## 2018-05-30 RX ORDER — BENZTROPINE MESYLATE 1 MG
1 TABLET ORAL
Qty: 30 | Refills: 0
Start: 2018-05-30 | End: 2018-06-28

## 2018-05-30 RX ORDER — RISPERIDONE 4 MG/1
1 TABLET ORAL
Qty: 30 | Refills: 0
Start: 2018-05-30 | End: 2018-06-28

## 2018-05-30 RX ADMIN — Medication 1 DROP(S): at 22:18

## 2018-05-30 RX ADMIN — Medication 1 MILLIGRAM(S): at 21:20

## 2018-05-30 RX ADMIN — RISPERIDONE 4 MILLIGRAM(S): 4 TABLET ORAL at 21:20

## 2018-05-30 RX ADMIN — Medication 1 DROP(S): at 06:50

## 2018-05-30 RX ADMIN — Medication 2 MILLIGRAM(S): at 09:55

## 2018-05-30 RX ADMIN — Medication 1 DROP(S): at 10:31

## 2018-05-30 RX ADMIN — Medication 650 MILLIGRAM(S): at 22:45

## 2018-05-30 RX ADMIN — Medication 1 MILLIGRAM(S): at 09:55

## 2018-05-30 RX ADMIN — Medication 1 DROP(S): at 17:39

## 2018-05-30 RX ADMIN — Medication 1 DROP(S): at 13:39

## 2018-05-31 VITALS — HEART RATE: 88 BPM | DIASTOLIC BLOOD PRESSURE: 76 MMHG | SYSTOLIC BLOOD PRESSURE: 129 MMHG | TEMPERATURE: 97 F

## 2018-05-31 PROCEDURE — 99239 HOSP IP/OBS DSCHRG MGMT >30: CPT | Mod: 25,GC

## 2018-05-31 PROCEDURE — 90853 GROUP PSYCHOTHERAPY: CPT

## 2018-05-31 RX ADMIN — Medication 0.5 MILLIGRAM(S): at 09:02

## 2018-05-31 RX ADMIN — Medication 1 MILLIGRAM(S): at 09:02

## 2018-06-06 ENCOUNTER — OUTPATIENT (OUTPATIENT)
Dept: OUTPATIENT SERVICES | Facility: HOSPITAL | Age: 28
LOS: 1 days | Discharge: HOME | End: 2018-06-06

## 2018-06-07 DIAGNOSIS — F23 BRIEF PSYCHOTIC DISORDER: ICD-10-CM

## 2019-10-16 ENCOUNTER — INPATIENT (INPATIENT)
Facility: HOSPITAL | Age: 29
LOS: 6 days | Discharge: HOME | End: 2019-10-23
Attending: PSYCHIATRY & NEUROLOGY | Admitting: PSYCHIATRY & NEUROLOGY
Payer: COMMERCIAL

## 2019-10-16 VITALS
RESPIRATION RATE: 18 BRPM | TEMPERATURE: 98 F | HEART RATE: 78 BPM | SYSTOLIC BLOOD PRESSURE: 178 MMHG | OXYGEN SATURATION: 100 % | DIASTOLIC BLOOD PRESSURE: 91 MMHG

## 2019-10-16 DIAGNOSIS — F29 UNSPECIFIED PSYCHOSIS NOT DUE TO A SUBSTANCE OR KNOWN PHYSIOLOGICAL CONDITION: ICD-10-CM

## 2019-10-16 LAB
ALBUMIN SERPL ELPH-MCNC: 4.5 G/DL — SIGNIFICANT CHANGE UP (ref 3.3–5)
ALP SERPL-CCNC: 93 U/L — SIGNIFICANT CHANGE UP (ref 40–120)
ALT FLD-CCNC: 17 U/L — SIGNIFICANT CHANGE UP (ref 4–41)
AMPHET UR-MCNC: NEGATIVE — SIGNIFICANT CHANGE UP
ANION GAP SERPL CALC-SCNC: 13 MMO/L — SIGNIFICANT CHANGE UP (ref 7–14)
APAP SERPL-MCNC: < 15 UG/ML — LOW (ref 15–25)
APPEARANCE UR: CLEAR — SIGNIFICANT CHANGE UP
AST SERPL-CCNC: 21 U/L — SIGNIFICANT CHANGE UP (ref 4–40)
BARBITURATES UR SCN-MCNC: NEGATIVE — SIGNIFICANT CHANGE UP
BASOPHILS # BLD AUTO: 0.02 K/UL — SIGNIFICANT CHANGE UP (ref 0–0.2)
BASOPHILS NFR BLD AUTO: 0.3 % — SIGNIFICANT CHANGE UP (ref 0–2)
BENZODIAZ UR-MCNC: NEGATIVE — SIGNIFICANT CHANGE UP
BILIRUB SERPL-MCNC: 0.5 MG/DL — SIGNIFICANT CHANGE UP (ref 0.2–1.2)
BILIRUB UR-MCNC: NEGATIVE — SIGNIFICANT CHANGE UP
BLOOD UR QL VISUAL: NEGATIVE — SIGNIFICANT CHANGE UP
BUN SERPL-MCNC: 6 MG/DL — LOW (ref 7–23)
CALCIUM SERPL-MCNC: 9.5 MG/DL — SIGNIFICANT CHANGE UP (ref 8.4–10.5)
CANNABINOIDS UR-MCNC: NEGATIVE — SIGNIFICANT CHANGE UP
CHLORIDE SERPL-SCNC: 103 MMOL/L — SIGNIFICANT CHANGE UP (ref 98–107)
CO2 SERPL-SCNC: 22 MMOL/L — SIGNIFICANT CHANGE UP (ref 22–31)
COCAINE METAB.OTHER UR-MCNC: NEGATIVE — SIGNIFICANT CHANGE UP
COLOR SPEC: SIGNIFICANT CHANGE UP
CREAT SERPL-MCNC: 0.95 MG/DL — SIGNIFICANT CHANGE UP (ref 0.5–1.3)
EOSINOPHIL # BLD AUTO: 0.05 K/UL — SIGNIFICANT CHANGE UP (ref 0–0.5)
EOSINOPHIL NFR BLD AUTO: 0.8 % — SIGNIFICANT CHANGE UP (ref 0–6)
ETHANOL BLD-MCNC: < 10 MG/DL — SIGNIFICANT CHANGE UP
GLUCOSE SERPL-MCNC: 111 MG/DL — HIGH (ref 70–99)
GLUCOSE UR-MCNC: NEGATIVE — SIGNIFICANT CHANGE UP
HCT VFR BLD CALC: 45.8 % — SIGNIFICANT CHANGE UP (ref 39–50)
HGB BLD-MCNC: 15.2 G/DL — SIGNIFICANT CHANGE UP (ref 13–17)
IMM GRANULOCYTES NFR BLD AUTO: 0.2 % — SIGNIFICANT CHANGE UP (ref 0–1.5)
KETONES UR-MCNC: NEGATIVE — SIGNIFICANT CHANGE UP
LEUKOCYTE ESTERASE UR-ACNC: NEGATIVE — SIGNIFICANT CHANGE UP
LYMPHOCYTES # BLD AUTO: 1.38 K/UL — SIGNIFICANT CHANGE UP (ref 1–3.3)
LYMPHOCYTES # BLD AUTO: 21.8 % — SIGNIFICANT CHANGE UP (ref 13–44)
MCHC RBC-ENTMCNC: 29.2 PG — SIGNIFICANT CHANGE UP (ref 27–34)
MCHC RBC-ENTMCNC: 33.2 % — SIGNIFICANT CHANGE UP (ref 32–36)
MCV RBC AUTO: 87.9 FL — SIGNIFICANT CHANGE UP (ref 80–100)
METHADONE UR-MCNC: NEGATIVE — SIGNIFICANT CHANGE UP
MONOCYTES # BLD AUTO: 0.67 K/UL — SIGNIFICANT CHANGE UP (ref 0–0.9)
MONOCYTES NFR BLD AUTO: 10.6 % — SIGNIFICANT CHANGE UP (ref 2–14)
NEUTROPHILS # BLD AUTO: 4.19 K/UL — SIGNIFICANT CHANGE UP (ref 1.8–7.4)
NEUTROPHILS NFR BLD AUTO: 66.3 % — SIGNIFICANT CHANGE UP (ref 43–77)
NITRITE UR-MCNC: NEGATIVE — SIGNIFICANT CHANGE UP
NRBC # FLD: 0 K/UL — SIGNIFICANT CHANGE UP (ref 0–0)
OPIATES UR-MCNC: NEGATIVE — SIGNIFICANT CHANGE UP
OXYCODONE UR-MCNC: NEGATIVE — SIGNIFICANT CHANGE UP
PCP UR-MCNC: NEGATIVE — SIGNIFICANT CHANGE UP
PH UR: 7 — SIGNIFICANT CHANGE UP (ref 5–8)
PLATELET # BLD AUTO: 180 K/UL — SIGNIFICANT CHANGE UP (ref 150–400)
PMV BLD: 11.3 FL — SIGNIFICANT CHANGE UP (ref 7–13)
POTASSIUM SERPL-MCNC: 3.8 MMOL/L — SIGNIFICANT CHANGE UP (ref 3.5–5.3)
POTASSIUM SERPL-SCNC: 3.8 MMOL/L — SIGNIFICANT CHANGE UP (ref 3.5–5.3)
PROT SERPL-MCNC: 7.8 G/DL — SIGNIFICANT CHANGE UP (ref 6–8.3)
PROT UR-MCNC: NEGATIVE — SIGNIFICANT CHANGE UP
RBC # BLD: 5.21 M/UL — SIGNIFICANT CHANGE UP (ref 4.2–5.8)
RBC # FLD: 11.7 % — SIGNIFICANT CHANGE UP (ref 10.3–14.5)
SALICYLATES SERPL-MCNC: < 5 MG/DL — LOW (ref 15–30)
SODIUM SERPL-SCNC: 138 MMOL/L — SIGNIFICANT CHANGE UP (ref 135–145)
SP GR SPEC: 1.02 — SIGNIFICANT CHANGE UP (ref 1–1.04)
TSH SERPL-MCNC: 0.98 UIU/ML — SIGNIFICANT CHANGE UP (ref 0.27–4.2)
UROBILINOGEN FLD QL: NORMAL — SIGNIFICANT CHANGE UP
WBC # BLD: 6.32 K/UL — SIGNIFICANT CHANGE UP (ref 3.8–10.5)
WBC # FLD AUTO: 6.32 K/UL — SIGNIFICANT CHANGE UP (ref 3.8–10.5)

## 2019-10-16 PROCEDURE — 99285 EMERGENCY DEPT VISIT HI MDM: CPT

## 2019-10-16 RX ORDER — LANOLIN ALCOHOL/MO/W.PET/CERES
3 CREAM (GRAM) TOPICAL AT BEDTIME
Refills: 0 | Status: DISCONTINUED | OUTPATIENT
Start: 2019-10-16 | End: 2019-10-17

## 2019-10-16 RX ORDER — RISPERIDONE 4 MG/1
0.5 TABLET ORAL
Refills: 0 | Status: DISCONTINUED | OUTPATIENT
Start: 2019-10-16 | End: 2019-10-17

## 2019-10-16 RX ORDER — HALOPERIDOL DECANOATE 100 MG/ML
5 INJECTION INTRAMUSCULAR EVERY 6 HOURS
Refills: 0 | Status: DISCONTINUED | OUTPATIENT
Start: 2019-10-16 | End: 2019-10-23

## 2019-10-16 RX ORDER — BENZTROPINE MESYLATE 1 MG
0.5 TABLET ORAL DAILY
Refills: 0 | Status: DISCONTINUED | OUTPATIENT
Start: 2019-10-16 | End: 2019-10-22

## 2019-10-16 RX ORDER — HALOPERIDOL DECANOATE 100 MG/ML
5 INJECTION INTRAMUSCULAR ONCE
Refills: 0 | Status: COMPLETED | OUTPATIENT
Start: 2019-10-16 | End: 2019-10-16

## 2019-10-16 RX ORDER — HALOPERIDOL DECANOATE 100 MG/ML
5 INJECTION INTRAMUSCULAR ONCE
Refills: 0 | Status: DISCONTINUED | OUTPATIENT
Start: 2019-10-16 | End: 2019-10-23

## 2019-10-16 RX ADMIN — HALOPERIDOL DECANOATE 5 MILLIGRAM(S): 100 INJECTION INTRAMUSCULAR at 10:54

## 2019-10-16 RX ADMIN — Medication 3 MILLIGRAM(S): at 21:11

## 2019-10-16 RX ADMIN — Medication 2 MILLIGRAM(S): at 10:54

## 2019-10-16 NOTE — ED BEHAVIORAL HEALTH ASSESSMENT NOTE - PRIMARY DX
Substance-induced psychotic disorder with delusions Psychosis, unspecified psychosis type Deferred condition on axis II

## 2019-10-16 NOTE — ED BEHAVIORAL HEALTH ASSESSMENT NOTE - DESCRIPTION
Pt highly agitated on initial presentation to the ED.  He received a total of Haldol 5mg/Ativan 2mg IM as STAT PRN's for agitation, ultimately with good effect.      Vital Signs Last 24 Hrs  T(C): 36.8 (16 Oct 2019 09:03), Max: 36.8 (16 Oct 2019 09:03)  T(F): 98.2 (16 Oct 2019 09:03), Max: 98.2 (16 Oct 2019 09:03)  HR: 78 (16 Oct 2019 09:03) (78 - 78)  BP: 178/91 (16 Oct 2019 09:03) (178/91 - 178/91)  BP(mean): --  RR: 18 (16 Oct 2019 09:03) (18 - 18)  SpO2: 100% (16 Oct 2019 09:03) (100% - 100%) None As per HPI.  Pt is , no children, lives with his wife & sister, originally from [facility], where most of his extended family still live. Pt highly agitated on initial presentation to the ED.  He received Haldol 5mg/Ativan 2mg IM x 1 as STAT PRN for agitation with good effect.      Vital Signs Last 24 Hrs  T(C): 36.8 (16 Oct 2019 09:03), Max: 36.8 (16 Oct 2019 09:03)  T(F): 98.2 (16 Oct 2019 09:03), Max: 98.2 (16 Oct 2019 09:03)  HR: 78 (16 Oct 2019 09:03) (78 - 78)  BP: 178/91 (16 Oct 2019 09:03) (178/91 - 178/91)  BP(mean): --  RR: 18 (16 Oct 2019 09:03) (18 - 18)  SpO2: 100% (16 Oct 2019 09:03) (100% - 100%)

## 2019-10-16 NOTE — ED BEHAVIORAL HEALTH ASSESSMENT NOTE - OTHER
brother not assessed some residual paranoia as per HPI somewhat somnolent but easily arousable to verbal stimuli n/a family [employer] database Reference #: [id number] appears internally stimulated [id number]

## 2019-10-16 NOTE — ED PROVIDER NOTE - OBJECTIVE STATEMENT
The patient is a 27y/o M p/w c/o acute stress and difficulty with communication. The patient has a past medical history of acute stress disorder with possible diagnosis of ADHD, MDD, Bipolar Disorder? As per the patient's wife, the patient has recently been under increased financial, professional, and emotional stress. The patient denies any active SI/HI, however on exam he has difficulty finding his words and appears internally preoccupied. He denies any current chest pain, shortness of breath, difficulty breathing. He also denies any f/c/n/v/d/c.

## 2019-10-16 NOTE — ED BEHAVIORAL HEALTH ASSESSMENT NOTE - INVOLUNTARY INTRAMUSCULAR MEDICATION DETAILS
Haldol 5mg and Ativan 2mg IM STAT PRN's for agitation at 10:39am Haldol 5mg and Ativan 2mg IM STAT PRN for agitation at 10:39am

## 2019-10-16 NOTE — ED BEHAVIORAL HEALTH ASSESSMENT NOTE - SUMMARY
Pt's history & presentation are most consistent with a substance-induced psychotic episode, induced in this case by Adderall.  The relatively low dose of Adderall that the patient has been taking, however, is not likely to cause adverse effects this pronounced in the absence of some underlying predisposition toward manic or psychotic illness; there is thus a relatively high likelihood that this is what has been unmasked in this case, specifically a previously unrecognized & unmanifested diathesis toward bipolar illness.  Both the pt & his family have been made aware of this likelihood, which is the principal rationale behind my offering him voluntary psychiatric admission, which he has declined.  In his current state, he does not quite appear to be at an acute or imminent risk of dangerousness of sufficient severity to meet involuntary commitment criteria. I have recommended to the pt that he remain in the ED to obtain more sleep & permit further psychiatric stabilization before he is permitted to leave, to which he is amenable.    Plan therefore will be to hold pt in the ED for re-assessment in the morning, at which time he will be discharged home if deemed stable enough to do so.  His family should be contacted at that point so they can come back to the ED & escort the pt home. Pt's history & presentation are most consistent with a decompensated psychotic episode. Pt's history & presentation are most consistent with a decompensated psychotic episode.  Admit to [address] on a 9.39 legal status.  No need for constant observation in a locked, supervised setting. This is a 28 year old  [place] male, domiciled, employed, non-caregiver, with psychiatric history of psychotic disorder, one prior psychiatric hospitalization at [address] from 5/20/18-5/31/18, no suicide attempts or non-suicidal self-injurious behavior, no violence or forensic history, no substance abuse history, who is brought in to [facility] ED by EMS activated by his family for a one week history of increasingly bizarre behavior & paranoid ideation, along with inability to sleep for the previous 3 days.  On arrival to the ED, the pt became extremely agitated & appeared internally preoccupied, required Haldol 5mg and Ativan 2mg IM injection as STAT PRN, following which he was adequately able to engage in interview.  On evaluation, pt is noted to be guarded, with thought blocking and is a poor historian.  Pt's history & presentation are most consistent with a decompensated psychotic episode in context of treatment non-compliance.  Admit to [address] on a 9.39 legal status.  No need for constant observation in a locked, supervised setting.  Recommend EMS transportation to unit with buckle guard for safety.

## 2019-10-16 NOTE — ED BEHAVIORAL HEALTH ASSESSMENT NOTE - VIOLENCE RISK FACTORS:
Affective dysregulation/Feeling of being under threat and being unable to control threat/Noncompliance with treatment

## 2019-10-16 NOTE — ED BEHAVIORAL HEALTH ASSESSMENT NOTE - DIFFERENTIAL
Substance-induced psychotic disorder  r/o bipolar disorder r/o bipolar disorder, manic v. schizoaffective disorder v. schizophrenia

## 2019-10-16 NOTE — ED PROVIDER NOTE - PROGRESS NOTE DETAILS
On reassessment, patient acute agitated on exam, not cooperative for blood draws, however redirect, given 5mg Haldol and will reassess.

## 2019-10-16 NOTE — ED ADULT NURSE NOTE - CHIEF COMPLAINT QUOTE
Patient brought to Er from home by EMS accompanied by his wife. Pt is here for noncompliance with medications and wife feels he is not acting the same. he is acting like he did when he had been dx with psychosis last may and stayed at [facility].

## 2019-10-16 NOTE — ED BEHAVIORAL HEALTH ASSESSMENT NOTE - SUICIDE RISK FACTORS
Agitation/Severe Anxiety/Panic Psychotic disorder current/past/Agitation/Severe Anxiety/Panic/Insomnia Agitation/Severe Anxiety/Panic/Unable to engage in safety planning/Psychotic disorder current/past/Insomnia

## 2019-10-16 NOTE — ED BEHAVIORAL HEALTH ASSESSMENT NOTE - OTHER PAST PSYCHIATRIC HISTORY (INCLUDE DETAILS REGARDING ONSET, COURSE OF ILLNESS, INPATIENT/OUTPATIENT TREATMENT)
As per HPI.  Prior to this month, patient has never received mental health treatment of any kind.  No lifetime history of symptoms consistent with depression, valeri or psychosis prior to the past 1-2 months. As per HPI.  Past psychiatric hospitalization at [address] from 5/20/18-5/31/18. As per HPI.  Past psychiatric hospitalization at [address] from 5/20/18-5/31/18.  Off medications x 1 year, previously treated at [facility] until 4/3/19.

## 2019-10-16 NOTE — ED BEHAVIORAL HEALTH ASSESSMENT NOTE - ACTIVATING EVENTS/STRESSORS
Change in provider or treatment (i.e., medications, psychotherapy, milieu) Non-compliant or not receiving treatment

## 2019-10-16 NOTE — ED BEHAVIORAL HEALTH ASSESSMENT NOTE - RISK ASSESSMENT
Pt is currently at moderately elevated risk of harm to self & others given his lingering paranoia & very recent episode of acute agitation & psychosis, and warrants continued observation in the ED until the morning & re-assessment for a final determination of risk prior to his being discharged home. Low Acute Suicide Risk Pt is currently at moderately elevated risk of harm to self & others given his lingering paranoia & very recent episode of acute agitation & psychosis, and warrants psych admission.

## 2019-10-16 NOTE — ED BEHAVIORAL HEALTH ASSESSMENT NOTE - SUICIDE PROTECTIVE FACTORS
Responsibility to family and others/Identifies reasons for living/Has future plans Responsibility to family and others/Supportive social network of family or friends

## 2019-10-16 NOTE — ED BEHAVIORAL HEALTH ASSESSMENT NOTE - HPI (INCLUDE ILLNESS QUALITY, SEVERITY, DURATION, TIMING, CONTEXT, MODIFYING FACTORS, ASSOCIATED SIGNS AND SYMPTOMS)
Mr. Denny Earl is a , domiciled with wife & sister, employed, non-caregiver, 28-y.o. AAM with no relevant medical history, a psychiatric history of psychotic disorder, no hospitalizations, no suicide attempts or non-suicidal self-injurious behavior, no violence or forensic history, no substance abuse history, who is brought in by his family to the ED for a ~2 week history of increasingly bizarre behavior & paranoid ideation, along with inability to sleep for the previous 36 hours.  On arrival to the ED, the pt became extremely agitated & appeared internally preoccupied, required Haldol 5mg and Ativan 2mg IM injection as STAT PRNs, following which he was adequately able to engage in interview.    On evaluation, pt is noted to be guarded, with thought blocking and is a poor historian.   He denies having had any mental health issues prior to about March of 2018 after losing his job due to layoffs in the office of the [employer] member where he had been working for the preceding 2 years as a .  Of note, pt states he has not been taking medications for the past year and is out of psychiatric treatment, and pt's family confirms.  Pt has been more angry & irritable & has started to feel paranoid, specifically that he is being targeted & monitored by the FBI, for unclear reasons.  He denies active suicidal ideation but does admit to passive thoughts of wishing he were dead, in order to "escape the pain that I'm in."  He denies thoughts of harming other people, denies auditory/visual hallucinations.  He denies any recent use of alcohol or illicit drugs.    At the time of interview, the pt continues to feel somewhat paranoid but denies passive or active suicidal ideation.  He continues to feel "less than clearheaded" but feels somewhat less confused & disoriented than he did at the time of initial presentation.  He is offered voluntary admission to inpatient psychiatry but declines, stating that he wishes to go home to be with his family, "eat a meal, and take a nap."   Pt's family is amenable to this plan as well.    SW met with patient’s wife, Tim Earl in family room to obtain collateral. All information below reported by Mrs. Earl.  Patient has been under a lot of stress recently due to family issues, financial issues, and fear of losing his job. Patient is currently working under temporary protected status which expires in January. Patient has applied for a renewal but has not heard back as of yet and is fearing the worst. Historically, patient begins to exhibit psychotic symptoms when under a lot of stress. Beginning last Wednesday, 10/9/19, patient was not sleeping well and appeared to be having racing negative thoughts. Last Thursday, 10/10/19, patient appeared disoriented, waxing and waning out of orientation. For the past three days, patient has not been sleeping and has not been completing any ADLs. Wife and family have been feeding him as he is not eating on his own and he has not been showering. Patient has not expressed any SI/HI/AH/VH and has not exhibited any self-injurious behaviors. Patient does not use any drugs and drinks wine on occasion. Patient had an inpatient psych admission at [facility] in May 2018, 5/20/18-5/31/18, where he was prescribed one medication upon discharge (name unknown to Wife). After two months, patient stopped taking this medication and flushed it down the toilet as both patient and family felt it was “too heavy” for him and it was making him “too drowsy”. Patient has not been on any medication since then. Patient sees a therapist in the community, Holly, but contact information is unknown. Wife reports patient is not “as psychotic” as he was prior to last inpatient hospitalization. Patient has verbalized that he needs help and would like to be put on a “less heavy” medication. Wife and family do not believe patient needs an inpatient admission and would rather take patient home to stay with his mother, father, and sister who can take care of him until medication is regulated. Mr. Denny Earl is a , domiciled with wife & sister, employed, non-caregiver, 28-y.o. AAM with no relevant medical history, a psychiatric history of psychotic disorder, no hospitalizations, no suicide attempts or non-suicidal self-injurious behavior, no violence or forensic history, no substance abuse history, who is brought in by his family to the ED for a one week history of increasingly bizarre behavior & paranoid ideation, along with inability to sleep for the previous 3 days.  On arrival to the ED, the pt became extremely agitated & appeared internally preoccupied, required Haldol 5mg and Ativan 2mg IM injection as STAT PRNs, following which he was adequately able to engage in interview.    On evaluation, pt is noted to be guarded, with thought blocking and is a poor historian.   He denies having had any mental health issues prior to about March of 2018 after losing his job due to layoffs in the office of the [employer] member where he had been working for the preceding 2 years as a .  Of note, pt states he has not been taking medications for the past year and is out of psychiatric treatment, and pt's family confirms.  Pt has been more angry & irritable & has started to feel paranoid, states vaguely being concerned about "everything happening in the world" and the "chaos," expressing concern for his and families safety as a result.  Patient is clearly suspicious and paranoid on exam, and although denies AH/VH, he does appear to be internally preoccupied.  In past patient stated concern that he was being targeted & monitored by the FBI, for unclear reasons.  He denies active suicidal ideation but does admit to passive thoughts of wishing he were dead in past but denies such thoughts currently.  He denies thoughts of harming other people, denies auditory/visual hallucinations.  He denies any recent use of alcohol or illicit drugs.    At the time of interview, the pt continues to feel somewhat paranoid but denies passive or active suicidal ideation.  He continues to minimally engage in interview and repeatedly states in perseverative manner "I just need my family" but shows poor insight into need for mental health treatment.  He is offered voluntary admission to inpatient psychiatry but declines, stating that he wishes to go home to be with his family, but as he is acutely psychotic and unable to care for self in the community requires psychiatric hospitalization for safety.   Pt's family is amenable to this plan as well.    See  note for collateral, also below:  SW met with patient’s wife, Tim Earl in family room to obtain collateral. All information below reported by Mrs. Earl.  Patient has been under a lot of stress recently due to family issues, financial issues, and fear of losing his job. Patient is currently working under temporary protected status which expires in January. Patient has applied for a renewal but has not heard back as of yet and is fearing the worst. Historically, patient begins to exhibit psychotic symptoms when under a lot of stress. Beginning last Wednesday, 10/9/19, patient was not sleeping well and appeared to be having racing negative thoughts. Last Thursday, 10/10/19, patient appeared disoriented, waxing and waning out of orientation. For the past three days, patient has not been sleeping and has not been completing any ADLs. Wife and family have been feeding him as he is not eating on his own and he has not been showering. Patient has not expressed any SI/HI/AH/VH and has not exhibited any self-injurious behaviors. Patient does not use any drugs and drinks wine on occasion. Patient had an inpatient psych admission at [facility] in May 2018, 5/20/18-5/31/18, where he was prescribed one medication upon discharge (name unknown to Wife). After two months, patient stopped taking this medication and flushed it down the toilet as both patient and family felt it was “too heavy” for him and it was making him “too drowsy”. Patient has not been on any medication since then. Patient sees a therapist in the community, Holly, but contact information is unknown. Wife reports patient is not “as psychotic” as he was prior to last inpatient hospitalization. Patient has verbalized that he needs help and would like to be put on a “less heavy” medication. Wife and family do not believe patient needs an inpatient admission and would rather take patient home to stay with his mother, father, and sister who can take care of him until medication is regulated. This is a 28 year old  male, domiciled with wife & sister, employed, non-caregiver, with no relevant medical history, a psychiatric history of psychotic disorder, one prior psychiatric hospitalization at [address] from 5/20/18-5/31/18, no suicide attempts or non-suicidal self-injurious behavior, no violence or forensic history, no substance abuse history, who is brought in to [facility] ED by EMS activated by his family for a one week history of increasingly bizarre behavior & paranoid ideation, along with inability to sleep for the previous 3 days.  On arrival to the ED, the pt became extremely agitated & appeared internally preoccupied, required Haldol 5mg and Ativan 2mg IM injection as STAT PRNs, following which he was adequately able to engage in interview.    On evaluation, pt is noted to be guarded, with thought blocking and is a poor historian.   He denies having had any mental health issues prior to about March of 2018 after losing his job due to layoffs in the office of the [employer] member where he had been working for the preceding 2 years as a .  Of note, pt states he has not been taking medications for the past year and is out of psychiatric treatment, noted discharge summary from [facility] on 4/3/19, and pt's family confirms.  Pt has been more angry & irritable & has started to feel paranoid, states vaguely being concerned about "everything happening in the world" and the "chaos," expressing concern for his and families safety as a result.  Patient is clearly suspicious and paranoid on exam, and although denies AH/VH, he does appear to be internally preoccupied.  In past patient stated concern that he was being targeted & monitored by the FBI, for unclear reasons.  He denies active suicidal ideation but does admit to passive thoughts of wishing he were dead in past but denies such thoughts currently.  He denies thoughts of harming other people, denies auditory/visual hallucinations.  He denies any recent use of alcohol or illicit drugs.    At the time of interview, the pt continues to feel somewhat paranoid but denies passive or active suicidal ideation.  He continues to minimally engage in interview and repeatedly states in perseverative manner "I just need my family" but shows poor insight into need for mental health treatment.  He is offered voluntary admission to inpatient psychiatry but declines, stating that he wishes to go home to be with his family, but as he is acutely psychotic and unable to care for self in the community requires psychiatric hospitalization for safety.   Pt's family is amenable to this plan as well.    See  note for collateral, also below:  SW met with patient’s wife, Tim Earl in family room to obtain collateral. All information below reported by Mrs. Earl.  Patient has been under a lot of stress recently due to family issues, financial issues, and fear of losing his job. Patient is currently working under temporary protected status which expires in January. Patient has applied for a renewal but has not heard back as of yet and is fearing the worst. Historically, patient begins to exhibit psychotic symptoms when under a lot of stress. Beginning last Wednesday, 10/9/19, patient was not sleeping well and appeared to be having racing negative thoughts. Last Thursday, 10/10/19, patient appeared disoriented, waxing and waning out of orientation. For the past three days, patient has not been sleeping and has not been completing any ADLs. Wife and family have been feeding him as he is not eating on his own and he has not been showering. Patient has not expressed any SI/HI/AH/VH and has not exhibited any self-injurious behaviors. Patient does not use any drugs and drinks wine on occasion. Patient had an inpatient psych admission at [facility] in May 2018, 5/20/18-5/31/18, where he was prescribed one medication upon discharge (name unknown to Wife). After two months, patient stopped taking this medication and flushed it down the toilet as both patient and family felt it was “too heavy” for him and it was making him “too drowsy”. Patient has not been on any medication since then. Patient sees a therapist in the community, Holly, but contact information is unknown. Wife reports patient is not “as psychotic” as he was prior to last inpatient hospitalization. Patient has verbalized that he needs help and would like to be put on a “less heavy” medication. Wife and family do not believe patient needs an inpatient admission and would rather take patient home to stay with his mother, father, and sister who can take care of him until medication is regulated. This is a 28 year old  male, domiciled with wife & sister, employed, non-caregiver, with no relevant medical history, a psychiatric history of psychotic disorder, one prior psychiatric hospitalization at [address] from 5/20/18-5/31/18, no suicide attempts or non-suicidal self-injurious behavior, no violence or forensic history, no substance abuse history, who is brought in to [facility] ED by EMS activated by his family for a one week history of increasingly bizarre behavior & paranoid ideation, along with inability to sleep for the previous 3 days.  On arrival to the ED, the pt became extremely agitated & appeared internally preoccupied, required Haldol 5mg and Ativan 2mg IM injection as STAT PRN, following which he was adequately able to engage in interview.    On evaluation, pt is noted to be guarded, with thought blocking and is a poor historian.   He denies having had any mental health issues prior to about March of 2018 after losing his job due to layoffs in the office of the [employer] member where he had been working for the preceding 2 years as a .  Of note, pt states he has not been taking medications for the past year and is out of psychiatric treatment, noted discharge summary from [facility] on 4/3/19, and pt's family confirms.  Pt has been more angry & irritable & has started to feel paranoid, states vaguely being concerned about "everything happening in the world" and the "chaos," expressing concern for his and families safety as a result.  Patient is clearly suspicious and paranoid on exam, and although denies AH/VH, he does appear to be internally preoccupied.  In past patient stated concern that he was being targeted & monitored by the FBI, for unclear reasons.  He denies active suicidal ideation but does admit to passive thoughts of wishing he were dead in past but denies such thoughts currently.  He denies thoughts of harming other people, denies auditory/visual hallucinations.  He denies any recent use of alcohol or illicit drugs.    At the time of interview, the pt continues to feel somewhat paranoid but denies passive or active suicidal ideation.  He continues to minimally engage in interview and repeatedly states in perseverative manner "I just need my family" but shows poor insight into need for mental health treatment.  He is offered voluntary admission to inpatient psychiatry but declines, stating that he wishes to go home to be with his family, but as he is acutely psychotic and unable to care for self in the community requires psychiatric hospitalization for safety.   Pt's family is amenable to this plan as well.    See  note for collateral from wife, significant information also below:  Patient has been under a lot of stress recently due to family issues, financial issues, and fear of losing his job. Patient is currently working under temporary protected status which expires in January. Patient has applied for a renewal but has not heard back as of yet and is fearing the worst. Historically, patient begins to exhibit psychotic symptoms when under a lot of stress. Beginning last Wednesday, 10/9/19, patient was not sleeping well and appeared to be having racing negative thoughts. Last Thursday, 10/10/19, patient appeared disoriented, waxing and waning out of orientation. For the past three days, patient has not been sleeping and has not been completing any ADLs. Wife and family have been feeding him as he is not eating on his own and he has not been showering. Patient has not expressed any SI/HI/AH/VH and has not exhibited any self-injurious behaviors. Patient does not use any drugs and drinks wine on occasion. Patient had an inpatient psych admission at [facility] in May 2018, 5/20/18-5/31/18, where he was prescribed one medication upon discharge (name unknown to Wife). After two months, patient stopped taking this medication, has not been on any medication since then.

## 2019-10-16 NOTE — ED BEHAVIORAL HEALTH NOTE - BEHAVIORAL HEALTH NOTE
GARY met with patient’s wife, Tim Earl in family room to obtain collateral. All information below reported by Mrs. Earl.  Patient has been under a lot of stress recently due to family issues, financial issues, and fear of losing his job. Patient is currently working under temporary protected status which expires in January. Patient has applied for a renewal but has not heard back as of yet and is fearing the worst. Historically, patient begins to exhibit psychotic symptoms when under a lot of stress. Beginning last Wednesday, 10/9/19, patient was not sleeping well and appeared to be having racing negative thoughts. Last Thursday, 10/10/19, patient appeared disoriented, waxing and waning out of orientation. For the past three days, patient has not been sleeping and has not been completing any ADLs. Wife and family have been feeding him as he is not eating on his own and he has not been showering. Patient has not expressed any SI/HI/AH/VH and has not exhibited any self-injurious behaviors. Patient does not use any drugs and drinks wine on occasion. Patient had an inpatient psych admission at [facility] in May 2018, 5/20/18-5/31/18, where he was prescribed one medication upon discharge (name unknown to Wife). After two months, patient stopped taking this medication and flushed it down the toilet as both patient and family felt it was “too heavy” for him and it was making him “too drowsy”. Patient has not been on any medication since then. Patient sees a therapist in the community, Holly, but contact information is unknown. Wife reports patient is not “as psychotic” as he was prior to last inpatient hospitalization. Patient has verbalized that he needs help and would like to be put on a “less heavy” medication. Wife and family do not believe patient needs an inpatient admission and would rather take patient home to stay with his mother, father, and sister who can take care of him until medication is regulated.

## 2019-10-16 NOTE — ED BEHAVIORAL HEALTH NOTE - BEHAVIORAL HEALTH NOTE
SW met with patient's wife, Tim in family room to inform of [facility] admission and provided information for [facility].

## 2019-10-16 NOTE — ED BEHAVIORAL HEALTH ASSESSMENT NOTE - PSYCHIATRIC ISSUES AND PLAN (INCLUDE STANDING AND PRN MEDICATION)
For acute agitation or dangerousness, can use Haldol 5mg PO / IM, Ativan 2mg PO / IM and/or Benadryl 50mg PO / IM, all Q6h PRN. Restart on Risperdal 0.5mg BID for psychosis, Cogentin 0.5mg daily for EPS prophylaxis, Melatonin 3mg HS prn insomnia, Haldol 5mg PO/IM prn agitation, Ativan 1mg PO q6hr prn anxiety, Ativan 2mg IM prn agitation

## 2019-10-16 NOTE — ED PROVIDER NOTE - CLINICAL SUMMARY MEDICAL DECISION MAKING FREE TEXT BOX
Patient with signs and symptoms of acute stress, possible psychosis, with underlying MDD, plan for psych consult and dispo pending results.

## 2019-10-16 NOTE — ED ADULT TRIAGE NOTE - CHIEF COMPLAINT QUOTE
Patient brought to Er from home by EMS accompanied by his wife. Pt is here for noncompliance with medications and wife feels he is not acting the same. he is acting like he did when he had been dx with psychosis last may and stayed at [facility].

## 2019-10-16 NOTE — ED BEHAVIORAL HEALTH ASSESSMENT NOTE - DETAILS
spoke with pt's brother Newton provided handoff to ED provider no hx of suicide attempts d/w [place] wife informed

## 2019-10-16 NOTE — ED ADULT NURSE REASSESSMENT NOTE - NS ED NURSE REASSESS COMMENT FT1
Pt irritable appears internally preoccupied pt medicated as per MD rx, safety & comfort measures maintained eval on going.
pt lying on bed in nad eyes close  breathing even & unlabored safety & comfort measures maintained
Patient transported to [address] via EMS, safety maintained.

## 2019-10-17 DIAGNOSIS — R69 ILLNESS, UNSPECIFIED: ICD-10-CM

## 2019-10-17 LAB
CHOLEST SERPL-MCNC: 241 MG/DL — HIGH (ref 120–199)
HBA1C BLD-MCNC: 5.8 % — HIGH (ref 4–5.6)
HDLC SERPL-MCNC: 64 MG/DL — HIGH (ref 35–55)
LIPID PNL WITH DIRECT LDL SERPL: 174 MG/DL — SIGNIFICANT CHANGE UP
TRIGL SERPL-MCNC: 69 MG/DL — SIGNIFICANT CHANGE UP (ref 10–149)

## 2019-10-17 PROCEDURE — 99222 1ST HOSP IP/OBS MODERATE 55: CPT | Mod: GC

## 2019-10-17 RX ORDER — DIPHENHYDRAMINE HCL 50 MG
50 CAPSULE ORAL ONCE
Refills: 0 | Status: DISCONTINUED | OUTPATIENT
Start: 2019-10-17 | End: 2019-10-23

## 2019-10-17 RX ORDER — LANOLIN ALCOHOL/MO/W.PET/CERES
5 CREAM (GRAM) TOPICAL AT BEDTIME
Refills: 0 | Status: DISCONTINUED | OUTPATIENT
Start: 2019-10-17 | End: 2019-10-23

## 2019-10-17 RX ORDER — ARIPIPRAZOLE 15 MG/1
5 TABLET ORAL DAILY
Refills: 0 | Status: DISCONTINUED | OUTPATIENT
Start: 2019-10-18 | End: 2019-10-18

## 2019-10-17 RX ORDER — ARIPIPRAZOLE 15 MG/1
5 TABLET ORAL ONCE
Refills: 0 | Status: COMPLETED | OUTPATIENT
Start: 2019-10-17 | End: 2019-10-17

## 2019-10-17 RX ORDER — DIPHENHYDRAMINE HCL 50 MG
50 CAPSULE ORAL EVERY 6 HOURS
Refills: 0 | Status: DISCONTINUED | OUTPATIENT
Start: 2019-10-17 | End: 2019-10-23

## 2019-10-17 RX ADMIN — Medication 0.5 MILLIGRAM(S): at 11:28

## 2019-10-17 RX ADMIN — ARIPIPRAZOLE 5 MILLIGRAM(S): 15 TABLET ORAL at 15:56

## 2019-10-17 RX ADMIN — Medication 1 MILLIGRAM(S): at 03:10

## 2019-10-17 RX ADMIN — Medication 1 MILLIGRAM(S): at 17:12

## 2019-10-17 RX ADMIN — RISPERIDONE 0.5 MILLIGRAM(S): 4 TABLET ORAL at 11:28

## 2019-10-17 RX ADMIN — Medication 1 MILLIGRAM(S): at 11:28

## 2019-10-17 RX ADMIN — Medication 5 MILLIGRAM(S): at 20:30

## 2019-10-18 PROCEDURE — 99232 SBSQ HOSP IP/OBS MODERATE 35: CPT | Mod: GC,25

## 2019-10-18 PROCEDURE — 90853 GROUP PSYCHOTHERAPY: CPT

## 2019-10-18 RX ORDER — CLONAZEPAM 1 MG
0.5 TABLET ORAL
Refills: 0 | Status: DISCONTINUED | OUTPATIENT
Start: 2019-10-18 | End: 2019-10-22

## 2019-10-18 RX ORDER — ACETAMINOPHEN 500 MG
650 TABLET ORAL ONCE
Refills: 0 | Status: COMPLETED | OUTPATIENT
Start: 2019-10-18 | End: 2019-10-18

## 2019-10-18 RX ORDER — ARIPIPRAZOLE 15 MG/1
10 TABLET ORAL DAILY
Refills: 0 | Status: DISCONTINUED | OUTPATIENT
Start: 2019-10-19 | End: 2019-10-23

## 2019-10-18 RX ORDER — CLONAZEPAM 1 MG
0.5 TABLET ORAL ONCE
Refills: 0 | Status: DISCONTINUED | OUTPATIENT
Start: 2019-10-18 | End: 2019-10-18

## 2019-10-18 RX ADMIN — Medication 1 MILLIGRAM(S): at 06:45

## 2019-10-18 RX ADMIN — Medication 5 MILLIGRAM(S): at 21:00

## 2019-10-18 RX ADMIN — Medication 650 MILLIGRAM(S): at 02:20

## 2019-10-18 RX ADMIN — Medication 650 MILLIGRAM(S): at 03:00

## 2019-10-18 RX ADMIN — Medication 0.5 MILLIGRAM(S): at 20:05

## 2019-10-18 RX ADMIN — ARIPIPRAZOLE 5 MILLIGRAM(S): 15 TABLET ORAL at 10:08

## 2019-10-18 RX ADMIN — Medication 0.5 MILLIGRAM(S): at 10:08

## 2019-10-19 PROCEDURE — 99232 SBSQ HOSP IP/OBS MODERATE 35: CPT

## 2019-10-19 RX ADMIN — Medication 0.5 MILLIGRAM(S): at 08:53

## 2019-10-19 RX ADMIN — ARIPIPRAZOLE 10 MILLIGRAM(S): 15 TABLET ORAL at 08:53

## 2019-10-19 RX ADMIN — Medication 0.5 MILLIGRAM(S): at 20:19

## 2019-10-19 RX ADMIN — Medication 5 MILLIGRAM(S): at 20:36

## 2019-10-20 PROCEDURE — 99232 SBSQ HOSP IP/OBS MODERATE 35: CPT

## 2019-10-20 RX ADMIN — Medication 5 MILLIGRAM(S): at 20:39

## 2019-10-20 RX ADMIN — ARIPIPRAZOLE 10 MILLIGRAM(S): 15 TABLET ORAL at 09:02

## 2019-10-20 RX ADMIN — Medication 0.5 MILLIGRAM(S): at 09:02

## 2019-10-20 RX ADMIN — Medication 0.5 MILLIGRAM(S): at 20:08

## 2019-10-21 PROCEDURE — 90853 GROUP PSYCHOTHERAPY: CPT

## 2019-10-21 PROCEDURE — 99232 SBSQ HOSP IP/OBS MODERATE 35: CPT | Mod: 25

## 2019-10-21 RX ADMIN — Medication 5 MILLIGRAM(S): at 20:57

## 2019-10-21 RX ADMIN — Medication 0.5 MILLIGRAM(S): at 20:57

## 2019-10-21 RX ADMIN — Medication 0.5 MILLIGRAM(S): at 08:34

## 2019-10-21 RX ADMIN — ARIPIPRAZOLE 10 MILLIGRAM(S): 15 TABLET ORAL at 08:34

## 2019-10-22 ENCOUNTER — EMERGENCY (EMERGENCY)
Facility: HOSPITAL | Age: 29
LOS: 1 days | Discharge: HOME | End: 2019-10-22
Attending: STUDENT IN AN ORGANIZED HEALTH CARE EDUCATION/TRAINING PROGRAM
Payer: COMMERCIAL

## 2019-10-22 VITALS
HEART RATE: 81 BPM | OXYGEN SATURATION: 100 % | SYSTOLIC BLOOD PRESSURE: 148 MMHG | RESPIRATION RATE: 16 BRPM | TEMPERATURE: 98 F | DIASTOLIC BLOOD PRESSURE: 92 MMHG

## 2019-10-22 PROCEDURE — 99282 EMERGENCY DEPT VISIT SF MDM: CPT | Mod: 25

## 2019-10-22 PROCEDURE — 12013 RPR F/E/E/N/L/M 2.6-5.0 CM: CPT

## 2019-10-22 PROCEDURE — 99232 SBSQ HOSP IP/OBS MODERATE 35: CPT | Mod: GC

## 2019-10-22 RX ORDER — TETANUS TOXOID, REDUCED DIPHTHERIA TOXOID AND ACELLULAR PERTUSSIS VACCINE, ADSORBED 5; 2.5; 8; 8; 2.5 [IU]/.5ML; [IU]/.5ML; UG/.5ML; UG/.5ML; UG/.5ML
0.5 SUSPENSION INTRAMUSCULAR ONCE
Refills: 0 | Status: COMPLETED | OUTPATIENT
Start: 2019-10-22 | End: 2019-10-22

## 2019-10-22 RX ORDER — CLONAZEPAM 1 MG
0.5 TABLET ORAL
Refills: 0 | Status: DISCONTINUED | OUTPATIENT
Start: 2019-10-22 | End: 2019-10-23

## 2019-10-22 RX ORDER — LIDOCAINE HCL 20 MG/ML
10 VIAL (ML) INJECTION ONCE
Refills: 0 | Status: COMPLETED | OUTPATIENT
Start: 2019-10-22 | End: 2019-10-22

## 2019-10-22 RX ADMIN — TETANUS TOXOID, REDUCED DIPHTHERIA TOXOID AND ACELLULAR PERTUSSIS VACCINE, ADSORBED 0.5 MILLILITER(S): 5; 2.5; 8; 8; 2.5 SUSPENSION INTRAMUSCULAR at 18:47

## 2019-10-22 RX ADMIN — Medication 5 MILLIGRAM(S): at 20:34

## 2019-10-22 RX ADMIN — ARIPIPRAZOLE 10 MILLIGRAM(S): 15 TABLET ORAL at 08:30

## 2019-10-22 RX ADMIN — Medication 0.5 MILLIGRAM(S): at 20:34

## 2019-10-22 RX ADMIN — Medication 2 MILLIGRAM(S): at 15:10

## 2019-10-22 RX ADMIN — Medication 10 MILLILITER(S): at 17:49

## 2019-10-22 RX ADMIN — Medication 0.5 MILLIGRAM(S): at 08:30

## 2019-10-22 RX ADMIN — HALOPERIDOL DECANOATE 5 MILLIGRAM(S): 100 INJECTION INTRAMUSCULAR at 15:10

## 2019-10-22 RX ADMIN — Medication 50 MILLIGRAM(S): at 15:10
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
## 2019-10-22 NOTE — ED PROVIDER NOTE - PATIENT PORTAL LINK FT
Normal for race You can access the FollowMyHealth Patient Portal offered by [facility] by registering at the following website: [email or web address]. By joining [facility]’s FollowMyHealth portal, you will also be able to view your health information using other applications (apps) compatible with our system.
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
## 2019-10-22 NOTE — ED PROVIDER NOTE - NSFOLLOWUPINSTRUCTIONS_ED_ALL_ED_FT
Laceration    A laceration is a cut that goes through all of the layers of the skin and into the tissue that is right under the skin. Some lacerations heal on their own. Others need to be closed with skin adhesive strips, skin glue, stitches (sutures), or staples. Proper laceration care minimizes the risk of infection and helps the laceration to heal better.  If non-absorbable stitches or staples have been placed, they must be taken out within the time frame instructed by your healthcare provider.    SEEK IMMEDIATE MEDICAL CARE IF YOU HAVE ANY OF THE FOLLOWING SYMPTOMS: swelling around the wound, worsening pain, drainage from the wound, red streaking going away from your wound, inability to move finger or toe near the laceration, or discoloration of skin near the laceration.    1. TAKE ALL MEDICATIONS AS DIRECTED.    2. FOR PAIN OR FEVER YOU CAN TAKE IBUPROFEN (MOTRIN, ADVIL) OR ACETAMINOPHEN (TYLENOL) AS NEEDED, AS DIRECTED ON PACKAGING.  3. FOLLOW UP WITH YOUR PRIMARY DOCTOR WITHIN 5 DAYS AS DIRECTED.  4. IF YOU HAD LABS OR IMAGING DONE, YOU WERE GIVEN COPIES OF ALL LABS AND/OR IMAGING RESULTS FROM YOUR ER VISIT--PLEASE TAKE THEM WITH YOU TO YOUR FOLLOW UP APPOINTMENTS.  5. IF NEEDED, CALL PATIENT ACCESS SERVICES AT [phone] TO FIND A PRIMARY CARE PHYSICIAN.  OR CALL [phone] TO MAKE AN APPOINTMENT WITH THE CLINIC.  6. RETURN TO THE ER FOR ANY WORSENING SYMPTOMS OR CONCERNS.      Laceration    A laceration is a cut that goes through all of the layers of the skin and into the tissue that is right under the skin. Some lacerations heal on their own. Others need to be closed with skin adhesive strips, skin glue, stitches (sutures), or staples. Proper laceration care minimizes the risk of infection and helps the laceration to heal better.  If non-absorbable stitches or staples have been placed, they must be taken out within the time frame instructed by your healthcare provider.    SEEK IMMEDIATE MEDICAL CARE IF YOU HAVE ANY OF THE FOLLOWING SYMPTOMS: swelling around the wound, worsening pain, drainage from the wound, red streaking going away from your wound, inability to move finger or toe near the laceration, or discoloration of skin near the laceration.
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
## 2019-10-23 VITALS — TEMPERATURE: 98 F | SYSTOLIC BLOOD PRESSURE: 122 MMHG | DIASTOLIC BLOOD PRESSURE: 90 MMHG | HEART RATE: 78 BPM

## 2019-10-23 PROBLEM — Z78.9 OTHER SPECIFIED HEALTH STATUS: Chronic | Status: ACTIVE | Noted: 2019-10-16

## 2019-10-23 PROCEDURE — 99238 HOSP IP/OBS DSCHRG MGMT 30/<: CPT

## 2019-10-23 RX ORDER — LANOLIN ALCOHOL/MO/W.PET/CERES
1 CREAM (GRAM) TOPICAL
Qty: 15 | Refills: 0
Start: 2019-10-23 | End: 2019-11-06

## 2019-10-23 RX ORDER — CLONAZEPAM 1 MG
1 TABLET ORAL
Qty: 30 | Refills: 0
Start: 2019-10-23 | End: 2019-11-06

## 2019-10-23 RX ORDER — ARIPIPRAZOLE 15 MG/1
1 TABLET ORAL
Qty: 15 | Refills: 0
Start: 2019-10-23 | End: 2019-11-06

## 2019-10-23 RX ADMIN — Medication 0.5 MILLIGRAM(S): at 09:00

## 2019-10-23 RX ADMIN — ARIPIPRAZOLE 10 MILLIGRAM(S): 15 TABLET ORAL at 09:00

## 2021-05-17 ENCOUNTER — APPOINTMENT (OUTPATIENT)
Dept: DISASTER EMERGENCY | Facility: OTHER | Age: 31
End: 2021-05-17

## 2021-05-26 ENCOUNTER — APPOINTMENT (OUTPATIENT)
Dept: DISASTER EMERGENCY | Facility: OTHER | Age: 31
End: 2021-05-26
Payer: COMMERCIAL

## 2021-05-26 PROCEDURE — 0012A: CPT

## 2022-07-06 NOTE — ED PROVIDER NOTE - DATA REVIEWED, MDM
vital signs Benzoyl Peroxide Counseling: Patient counseled that medicine may cause skin irritation and bleach clothing.  In the event of skin irritation, the patient was advised to reduce the amount of the drug applied or use it less frequently.   The patient verbalized understanding of the proper use and possible adverse effects of benzoyl peroxide.  All of the patient's questions and concerns were addressed.